# Patient Record
Sex: FEMALE | Race: WHITE | NOT HISPANIC OR LATINO | URBAN - METROPOLITAN AREA
[De-identification: names, ages, dates, MRNs, and addresses within clinical notes are randomized per-mention and may not be internally consistent; named-entity substitution may affect disease eponyms.]

---

## 2018-03-14 ENCOUNTER — OFFICE VISIT (OUTPATIENT)
Dept: CARDIOLOGY | Facility: CLINIC | Age: 78
End: 2018-03-14
Attending: INTERNAL MEDICINE
Payer: MEDICARE

## 2018-03-14 VITALS
BODY MASS INDEX: 22.98 KG/M2 | HEIGHT: 59 IN | HEART RATE: 78 BPM | DIASTOLIC BLOOD PRESSURE: 75 MMHG | WEIGHT: 114 LBS | SYSTOLIC BLOOD PRESSURE: 155 MMHG

## 2018-03-14 DIAGNOSIS — I77.1 SUBCLAVIAN ARTERY STENOSIS: ICD-10-CM

## 2018-03-14 DIAGNOSIS — Z98.890 HISTORY OF NECK SURGERY: ICD-10-CM

## 2018-03-14 DIAGNOSIS — R07.2 PRECORDIAL PAIN: ICD-10-CM

## 2018-03-14 DIAGNOSIS — I10 ESSENTIAL HYPERTENSION: ICD-10-CM

## 2018-03-14 DIAGNOSIS — I65.23 BILATERAL CAROTID ARTERY STENOSIS: ICD-10-CM

## 2018-03-14 DIAGNOSIS — E78.00 HYPERCHOLESTEROLEMIA: ICD-10-CM

## 2018-03-14 PROCEDURE — 99214 OFFICE O/P EST MOD 30 MIN: CPT | Mod: S$GLB,,, | Performed by: INTERNAL MEDICINE

## 2018-03-14 PROCEDURE — 3077F SYST BP >= 140 MM HG: CPT | Mod: CPTII,S$GLB,, | Performed by: INTERNAL MEDICINE

## 2018-03-14 PROCEDURE — 3078F DIAST BP <80 MM HG: CPT | Mod: CPTII,S$GLB,, | Performed by: INTERNAL MEDICINE

## 2018-03-14 NOTE — PROGRESS NOTES
Subjective:     Ting Cota is a 77 y.o. female with hypertension and hypercholesterolemia. She had a Stress Echocardiogram in 2013 that was negative. She had T wave inversion in the anteroseptal leads at that time. On 10/26/2015 she had an Echocardiogram that revealed normal left ventricular size and systolic function with moderate left ventricular hypertrophy with a sigmoid septum. There was mild diastolic dysfunction and the aortic valve was mildy sclerotic. A Carotid Duplex on the same day revealed the BRITT to have moderate plaquing with a less than 50% stenosis. The LICA had severe plaquing with a 50-60% stenosis. The right vertebral had retrograde flow suggesting a subclavian steal syndrome. A blood pressure check revealed a systolic pressure in the left arm of 160 mmHg whereas it was 115 mmHg in the right arm. 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 On 1/21/2016 she underwent a Stress MPI during which she was able to do 6:30 minutes and there was no chest pain. The ECG was negative as was the MPI. She developed weakness in her arms and was diagnosed with severe cervical disease and underwent neck surgery on 8/23/2016. No exertional chest pain or shortness of breath. No palpitations or weak spells. No bleeding. Still works part time. Feeling well overall.     Chest Pain    This is a new problem. The current episode started more than 1 month ago. The problem has been resolved. Pertinent negatives include no abdominal pain, back pain, claudication, cough, diaphoresis, dizziness, exertional chest pressure, fever, headaches, hemoptysis, irregular heartbeat, leg pain, lower extremity edema, malaise/fatigue, nausea, near-syncope, numbness, orthopnea, palpitations, PND, shortness of breath, sputum production, syncope, vomiting or weakness.   Her past medical history is  significant for hyperlipidemia.   Pertinent negatives for past medical history include no diabetes.   Hypertension   This is a chronic problem. The current episode started more than 1 year ago. The problem is unchanged. The problem is controlled (usually 120-140/60-80 mmHg at home). Pertinent negatives include no anxiety, blurred vision, chest pain, headaches, malaise/fatigue, neck pain, orthopnea, palpitations, peripheral edema, PND, shortness of breath or sweats. There is no history of chronic renal disease.   Hyperlipidemia   This is a chronic problem. The current episode started more than 1 year ago. The problem is controlled. Recent lipid tests were reviewed and are normal. She has no history of chronic renal disease, diabetes, hypothyroidism, liver disease, obesity or nephrotic syndrome. Pertinent negatives include no chest pain, focal sensory loss, focal weakness, leg pain, myalgias or shortness of breath.       Review of Systems   Constitution: Negative for chills, diaphoresis, fever, weakness and malaise/fatigue.   HENT: Negative for nosebleeds.    Eyes: Negative for blurred vision, vision loss in left eye and vision loss in right eye.   Cardiovascular: Negative for chest pain, claudication, dyspnea on exertion, irregular heartbeat, leg swelling, near-syncope, orthopnea, palpitations, paroxysmal nocturnal dyspnea and syncope.   Respiratory: Negative for cough, hemoptysis, shortness of breath, sputum production and wheezing.    Endocrine: Negative for cold intolerance and heat intolerance.   Hematologic/Lymphatic: Negative for bleeding problem. Does not bruise/bleed easily.   Skin: Negative for dry skin and rash.   Musculoskeletal: Negative for back pain, falls, gout, muscle cramps, myalgias and neck pain.   Gastrointestinal: Negative for abdominal pain, diarrhea, hematemesis, hematochezia, hemorrhoids, jaundice, melena, nausea and vomiting.   Genitourinary: Negative for dysuria, hematuria and menorrhagia.  "  Neurological: Negative for dizziness, focal weakness, headaches, light-headedness, loss of balance, numbness, tremors and vertigo.   Psychiatric/Behavioral: Negative for altered mental status, depression and memory loss. The patient is not nervous/anxious.    Allergic/Immunologic: Negative for hives and persistent infections.       Current Outpatient Prescriptions on File Prior to Visit   Medication Sig Dispense Refill    alendronate (FOSAMAX) 70 MG tablet   2    amlodipine (NORVASC) 10 MG tablet Take 1 tablet (10 mg total) by mouth once daily. 90 tablet 3    amoxicillin-clavulanate 875-125mg (AUGMENTIN) 875-125 mg per tablet   0    aspirin (ECOTRIN) 81 MG EC tablet Take 1 tablet (81 mg total) by mouth once daily. 90 tablet 3    atorvastatin (LIPITOR) 40 MG tablet Take 1 tablet (40 mg total) by mouth once daily. 90 tablet 3    benzonatate (TESSALON) 100 MG capsule   0    calcium carbonate (OS-MIGUEL) 500 mg calcium (1,250 mg) chewable tablet Take 1 tablet by mouth once daily.      CALCIUM GLUBIONATE (CALCIONATE ORAL) Take by mouth.      erythromycin (ROMYCIN) ophthalmic ointment   0    gabapentin (NEURONTIN) 100 MG capsule   2    hydrochlorothiazide (MICROZIDE) 12.5 mg capsule Take 1 capsule (12.5 mg total) by mouth once daily. 90 capsule 3    hydrocodone-acetaminophen 7.5-325mg (NORCO) 7.5-325 mg per tablet TK 1 T PO  BID TO TID  0    olmesartan (BENICAR) 40 MG tablet Take 1 tablet (40 mg total) by mouth once daily. 90 tablet 3    paroxetine (PAXIL) 10 MG tablet Take 10 mg by mouth every morning.      tizanidine (ZANAFLEX) 2 MG tablet   2    VESICARE 10 mg tablet TK 1 T PO QD  3    vitamin D 1000 units Tab Take 185 mg by mouth once daily.       No current facility-administered medications on file prior to visit.        BP (!) 155/75   Pulse 78   Ht 4' 11" (1.499 m)   Wt 51.7 kg (114 lb)   BMI 23.03 kg/m²       Objective:     Physical Exam   Constitutional: She is oriented to person, place, and " time. She appears well-developed and well-nourished. She does not appear ill. No distress.   HENT:   Head: Normocephalic and atraumatic.   Nose: Nose normal.   Eyes: Right eye exhibits no discharge. Left eye exhibits no discharge. Right conjunctiva is not injected. Left conjunctiva is not injected. Right pupil is round. Left pupil is round. Pupils are equal.   Neck: No JVD present. Carotid bruit is present (louder on the right than on the left side). No tracheal deviation present. No thyromegaly present.   Midline scar posteriorly.   Cardiovascular: Normal rate, regular rhythm, S1 normal and S2 normal.   No extrasystoles are present. PMI is not displaced.  Exam reveals gallop and S4. Exam reveals no S3.    Murmur heard.   Harsh midsystolic murmur is present with a grade of 2/6  at the upper right sternal border radiating to the neck  Pulses:       Carotid pulses are on the right side with bruit, and on the left side with bruit.       Radial pulses are 1+ on the right side, and 2+ on the left side.        Femoral pulses are 2+ on the right side, and 2+ on the left side.       Dorsalis pedis pulses are 1+ on the right side, and 1+ on the left side.        Posterior tibial pulses are 1+ on the right side, and 1+ on the left side.   Pulmonary/Chest: Effort normal and breath sounds normal.   Abdominal: Soft. Normal appearance. There is no hepatosplenomegaly. There is no tenderness.   Musculoskeletal:        Right ankle: She exhibits no swelling, no ecchymosis and no deformity.        Left ankle: She exhibits no swelling, no ecchymosis and no deformity.   Lymphadenopathy:        Head (right side): No submandibular adenopathy present.        Head (left side): No submandibular adenopathy present.     She has no cervical adenopathy.   Neurological: She is alert and oriented to person, place, and time. She is not disoriented. No cranial nerve deficit or sensory deficit.   Skin: Skin is warm, dry and intact. No rash noted. She  "is not diaphoretic. Nails show no clubbing.   Psychiatric: She has a normal mood and affect. Her speech is normal and behavior is normal. Judgment and thought content normal. Cognition and memory are normal.       Assessment:      1. Precordial pain    2. Bilateral carotid artery stenosis    3. Subclavian artery stenosis    4. Essential hypertension    5. Hypercholesterolemia    6. History of neck surgery        Plan:     1. Chest Pain   9/23/2013: Stress Echo: 4:30 min. No CP. ECG negative. Echo "negative" with moderate LVH.   10/26/2015: Echo: Normal left ventricular size and systolic function. Moderate LVH. Sigmoid septum. Mild diastolic dysfunction. Mildly dilated LA. Mild aortic valve sclerosis.   12/17/2015: Episode of chest tightness.   12/21/2015: ECG: SB 57. T wave inversion V1-V6 that is similar to 2013.   1/21/2016: Stress MPI: 6:30 min. No CP. ECG negative. MPI negative.   Appeared atypical and now resolved.    2. Carotid Artery Stenosis   10/26/2015: Carotid Duplex: BRITT: Moderate plaquing - <50%. LICA: Severe plaquing - 50-60%. Right vertebral: Retrograde flow.   10/17/2016: Carotid Duplex: BRITT: Moderate plaquing - <50%. LICA: Moderate plaquing - 1.2 m/s - 50-60%. Right vertebral: Retrograde flow.    10/16/2017: Carotid Duplex: BRITT: Moderate plaquing - <50%. LICA: Severe plaquing - 1.5 m/s - 50-60%.   Control risk factors.   On aspirin 81 mg Q24.   10/2018: Plan next Carotid Duplex. Then yearly.    3. Subclavian Artery Stenosis   2015: Clinical diagnosis.   10/26/2015: Carotid Duplex: Right vertebral: Retrograde flow.   Right arm: 115/55 mmHg. Left arm: 160/68 mmHg.   May have had a little heaviness in the right arm. Never dizzy.   Appears asymptomatic now.    4. Hypertension   1995: Diagnosed.   On amlodipine 10 mg Q24, olmesartan 40 mg Q24 and hctz 12.5 mg Q24.   Keeping log at home.   Appears well controlled at home but high here.   Follow closely.       5. Hypercholesterolemia   2005: Began " statin.   On atorvastatin 10 mg Q24.   12/28/2015: Chol 189. HDL 45. . .   On atorvastatin to 40 mg Q24.   2/8/2016: Chol 179. HDL 48. . .   On atorvastatin to 40 mg Q24.   Reasonably well controlled.    6. History of Cervical Neck Surgery   8/23/2016: Cervical neck surgery.    Dr. Ramsey Becker.     7. Primary Care   Dr. Sultana Guzman.    F/u 4 months.    Tammy Walton M.D.

## 2018-04-05 ENCOUNTER — OFFICE VISIT (OUTPATIENT)
Dept: CARDIOLOGY | Facility: CLINIC | Age: 78
End: 2018-04-05
Attending: INTERNAL MEDICINE
Payer: MEDICARE

## 2018-04-05 VITALS
HEIGHT: 59 IN | HEART RATE: 87 BPM | WEIGHT: 115 LBS | BODY MASS INDEX: 23.18 KG/M2 | DIASTOLIC BLOOD PRESSURE: 66 MMHG | SYSTOLIC BLOOD PRESSURE: 145 MMHG

## 2018-04-05 DIAGNOSIS — R07.2 PRECORDIAL PAIN: ICD-10-CM

## 2018-04-05 DIAGNOSIS — I65.23 BILATERAL CAROTID ARTERY STENOSIS: ICD-10-CM

## 2018-04-05 DIAGNOSIS — E78.00 HYPERCHOLESTEROLEMIA: ICD-10-CM

## 2018-04-05 DIAGNOSIS — I50.33 HEART FAILURE, DIASTOLIC, ACUTE ON CHRONIC: ICD-10-CM

## 2018-04-05 DIAGNOSIS — I10 ESSENTIAL HYPERTENSION: ICD-10-CM

## 2018-04-05 DIAGNOSIS — I77.1 SUBCLAVIAN ARTERY STENOSIS: ICD-10-CM

## 2018-04-05 DIAGNOSIS — Z98.890 HISTORY OF NECK SURGERY: ICD-10-CM

## 2018-04-05 PROCEDURE — 99214 OFFICE O/P EST MOD 30 MIN: CPT | Mod: S$GLB,,, | Performed by: INTERNAL MEDICINE

## 2018-04-05 PROCEDURE — 3077F SYST BP >= 140 MM HG: CPT | Mod: CPTII,S$GLB,, | Performed by: INTERNAL MEDICINE

## 2018-04-05 PROCEDURE — 3078F DIAST BP <80 MM HG: CPT | Mod: CPTII,S$GLB,, | Performed by: INTERNAL MEDICINE

## 2018-04-05 RX ORDER — FUROSEMIDE 20 MG/1
20 TABLET ORAL DAILY
Qty: 90 TABLET | Refills: 3 | Status: SHIPPED | OUTPATIENT
Start: 2018-04-05 | End: 2018-07-13 | Stop reason: SDUPTHER

## 2018-04-05 NOTE — PROGRESS NOTES
Subjective:     Ting Cota is a 77 y.o. female with hypertension and hypercholesterolemia. She had a Stress Echocardiogram in 2013 that was negative. She had T wave inversion in the anteroseptal leads at that time. On 10/26/2015 she had an Echocardiogram that revealed normal left ventricular size and systolic function with moderate left ventricular hypertrophy with a sigmoid septum. There was mild diastolic dysfunction and the aortic valve was mildy sclerotic. A Carotid Duplex on the same day revealed the BRITT to have moderate plaquing with a less than 50% stenosis. The LICA had severe plaquing with a 50-60% stenosis. The right vertebral had retrograde flow suggesting a subclavian steal syndrome. A blood pressure check revealed a systolic pressure in the left arm of 160 mmHg whereas it was 115 mmHg in the right arm. 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 On 1/21/2016 she underwent a Stress MPI during which she was able to do 6:30 minutes and there was no chest pain. The ECG was negative as was the MPI. She developed weakness in her arms and was diagnosed with severe cervical disease and underwent neck surgery on 8/23/2016. She developed an upper respiratory infection in late 3/2018 and was seen at an urgent care center and given antibiotics. A CXR revealed small pleural effusions. No exertional chest pain but shortness of breath. No palpitations or weak spells. No bleeding. Still works part time. Feeling well overall.     Chest Pain    This is a new problem. The current episode started more than 1 month ago. The problem has been resolved. Associated symptoms include a cough, orthopnea and shortness of breath. Pertinent negatives include no abdominal pain, back pain, claudication, diaphoresis, dizziness, exertional chest pressure, fever, headaches, hemoptysis, irregular  heartbeat, leg pain, lower extremity edema, malaise/fatigue, nausea, near-syncope, numbness, palpitations, PND, sputum production, syncope, vomiting or weakness.   Her past medical history is significant for CHF and hyperlipidemia.   Pertinent negatives for past medical history include no diabetes and no muscle weakness.   Hypertension   This is a chronic problem. The current episode started more than 1 year ago. The problem is unchanged. The problem is controlled (usually 120-140/60-80 mmHg at home). Associated symptoms include chest pain, neck pain, orthopnea and shortness of breath. Pertinent negatives include no anxiety, blurred vision, headaches, malaise/fatigue, palpitations, peripheral edema, PND or sweats. There is no history of chronic renal disease.   Hyperlipidemia   This is a chronic problem. The current episode started more than 1 year ago. The problem is controlled. Recent lipid tests were reviewed and are normal. She has no history of chronic renal disease, diabetes, hypothyroidism, liver disease, obesity or nephrotic syndrome. Associated symptoms include chest pain and shortness of breath. Pertinent negatives include no focal sensory loss, focal weakness, leg pain or myalgias.   Congestive Heart Failure   Presents for follow-up visit. Associated symptoms include chest pain and shortness of breath. Pertinent negatives include no abdominal pain, chest pressure, claudication, edema, fatigue, muscle weakness, near-syncope, nocturia, orthopnea, palpitations, paroxysmal nocturnal dyspnea or unexpected weight change. The symptoms have been worsening.       Review of Systems   Constitution: Negative for chills, diaphoresis, fatigue, fever, weakness, malaise/fatigue and unexpected weight change.   HENT: Negative for nosebleeds.    Eyes: Negative for blurred vision, vision loss in left eye and vision loss in right eye.   Cardiovascular: Positive for chest pain and orthopnea. Negative for claudication, dyspnea  on exertion, irregular heartbeat, leg swelling, near-syncope, palpitations, paroxysmal nocturnal dyspnea and syncope.   Respiratory: Positive for cough and shortness of breath. Negative for hemoptysis, sputum production and wheezing.    Endocrine: Negative for cold intolerance and heat intolerance.   Hematologic/Lymphatic: Negative for bleeding problem. Does not bruise/bleed easily.   Skin: Negative for dry skin and rash.   Musculoskeletal: Positive for neck pain. Negative for back pain, falls, gout, muscle cramps, muscle weakness and myalgias.   Gastrointestinal: Negative for abdominal pain, diarrhea, hematemesis, hematochezia, hemorrhoids, jaundice, melena, nausea and vomiting.   Genitourinary: Negative for dysuria, hematuria, menorrhagia and nocturia.   Neurological: Negative for dizziness, focal weakness, headaches, light-headedness, loss of balance, numbness, tremors and vertigo.   Psychiatric/Behavioral: Negative for altered mental status, depression and memory loss. The patient is not nervous/anxious.    Allergic/Immunologic: Negative for hives and persistent infections.       Current Outpatient Prescriptions on File Prior to Visit   Medication Sig Dispense Refill    alendronate (FOSAMAX) 70 MG tablet   2    amlodipine (NORVASC) 10 MG tablet Take 1 tablet (10 mg total) by mouth once daily. 90 tablet 3    amoxicillin-clavulanate 875-125mg (AUGMENTIN) 875-125 mg per tablet   0    aspirin (ECOTRIN) 81 MG EC tablet Take 1 tablet (81 mg total) by mouth once daily. 90 tablet 3    atorvastatin (LIPITOR) 40 MG tablet Take 1 tablet (40 mg total) by mouth once daily. 90 tablet 3    benzonatate (TESSALON) 100 MG capsule   0    calcium carbonate (OS-MIGUEL) 500 mg calcium (1,250 mg) chewable tablet Take 1 tablet by mouth once daily.      CALCIUM GLUBIONATE (CALCIONATE ORAL) Take by mouth.      erythromycin (ROMYCIN) ophthalmic ointment   0    gabapentin (NEURONTIN) 100 MG capsule   2    hydrochlorothiazide  "(MICROZIDE) 12.5 mg capsule Take 1 capsule (12.5 mg total) by mouth once daily. 90 capsule 3    hydrocodone-acetaminophen 7.5-325mg (NORCO) 7.5-325 mg per tablet TK 1 T PO  BID TO TID  0    olmesartan (BENICAR) 40 MG tablet Take 1 tablet (40 mg total) by mouth once daily. 90 tablet 3    paroxetine (PAXIL) 10 MG tablet Take 10 mg by mouth every morning.      tizanidine (ZANAFLEX) 2 MG tablet   2    VESICARE 10 mg tablet TK 1 T PO QD  3    vitamin D 1000 units Tab Take 185 mg by mouth once daily.       No current facility-administered medications on file prior to visit.        BP (!) 145/66   Pulse 87   Ht 4' 11" (1.499 m)   Wt 52.2 kg (115 lb)   BMI 23.23 kg/m²       Objective:     Physical Exam   Constitutional: She is oriented to person, place, and time. She appears well-developed and well-nourished. She does not appear ill. No distress.   HENT:   Head: Normocephalic and atraumatic.   Nose: Nose normal.   Eyes: Right eye exhibits no discharge. Left eye exhibits no discharge. Right conjunctiva is not injected. Left conjunctiva is not injected. Right pupil is round. Left pupil is round. Pupils are equal.   Neck: No JVD present. Carotid bruit is present (louder on the right than on the left side). No tracheal deviation present. No thyromegaly present.   Midline scar posteriorly.   Cardiovascular: Normal rate, regular rhythm, S1 normal and S2 normal.   No extrasystoles are present. PMI is not displaced.  Exam reveals gallop and S4. Exam reveals no S3.    Murmur heard.   Harsh midsystolic murmur is present with a grade of 2/6  at the upper right sternal border radiating to the neck  Pulses:       Carotid pulses are on the right side with bruit, and on the left side with bruit.       Radial pulses are 1+ on the right side, and 2+ on the left side.        Femoral pulses are 2+ on the right side, and 2+ on the left side.       Dorsalis pedis pulses are 1+ on the right side, and 1+ on the left side.        " "Posterior tibial pulses are 1+ on the right side, and 1+ on the left side.   Pulmonary/Chest: Effort normal. She has rales in the right lower field and the left lower field.   Abdominal: Soft. Normal appearance. There is no hepatosplenomegaly. There is no tenderness.   Musculoskeletal:        Right ankle: She exhibits no swelling, no ecchymosis and no deformity.        Left ankle: She exhibits no swelling, no ecchymosis and no deformity.   Lymphadenopathy:        Head (right side): No submandibular adenopathy present.        Head (left side): No submandibular adenopathy present.     She has no cervical adenopathy.   Neurological: She is alert and oriented to person, place, and time. She is not disoriented. No cranial nerve deficit or sensory deficit.   Skin: Skin is warm, dry and intact. No rash noted. She is not diaphoretic. Nails show no clubbing.   Psychiatric: She has a normal mood and affect. Her speech is normal and behavior is normal. Judgment and thought content normal. Cognition and memory are normal.       Assessment:      1. Precordial pain    2. Heart failure, diastolic, acute on chronic    3. Bilateral carotid artery stenosis    4. Subclavian artery stenosis    5. Essential hypertension    6. Hypercholesterolemia    7. History of neck surgery        Plan:     1. Chest Pain   9/23/2013: Stress Echo: 4:30 min. No CP. ECG negative. Echo "negative" with moderate LVH.   10/26/2015: Echo: Normal left ventricular size and systolic function. Moderate LVH. Sigmoid septum. Mild diastolic dysfunction. Mildly dilated LA. Mild aortic valve sclerosis.   12/17/2015: Episode of chest tightness.   12/21/2015: ECG: SB 57. T wave inversion V1-V6 that is similar to 2013.   1/21/2016: Stress MPI: 6:30 min. No CP. ECG negative. MPI negative.   Appeared atypical and now resolved.    2. Heart Failure, Diastolic, Acute on Chronic   4/4/2018: CXR: Small pleural effusions.   Change hctz to furosemide 20 mg Q24.   4/12/2018: BMP " and BNP.   4/20/2018: CXR.   4/2018: Plan Echo.    3. Carotid Artery Stenosis   10/26/2015: Carotid Duplex: BRITT: Moderate plaquing - <50%. LICA: Severe plaquing - 50-60%. Right vertebral: Retrograde flow.   10/17/2016: Carotid Duplex: BRITT: Moderate plaquing - <50%. LICA: Moderate plaquing - 1.2 m/s - 50-60%. Right vertebral: Retrograde flow.    10/16/2017: Carotid Duplex: BRITT: Moderate plaquing - <50%. LICA: Severe plaquing - 1.5 m/s - 50-60%.   Control risk factors.   On aspirin 81 mg Q24.   10/2018: Plan next Carotid Duplex. Then yearly.    4. Subclavian Artery Stenosis   2015: Clinical diagnosis.   10/26/2015: Carotid Duplex: Right vertebral: Retrograde flow.   Right arm: 115/55 mmHg. Left arm: 160/68 mmHg.   May have had a little heaviness in the right arm. Never dizzy.   Appears asymptomatic now.    5. Hypertension   1995: Diagnosed.   On amlodipine 10 mg Q24, olmesartan 40 mg Q24 and hctz 12.5 mg Q24.   Keeping log at home.   Appears well controlled at home but high here.   Follow closely.       6. Hypercholesterolemia   2005: Began statin.   On atorvastatin 10 mg Q24.   12/28/2015: Chol 189. HDL 45. . .   On atorvastatin to 40 mg Q24.   2/8/2016: Chol 179. HDL 48. . .   On atorvastatin to 40 mg Q24.   Reasonably well controlled.    7. History of Cervical Neck Surgery   8/23/2016: Cervical neck surgery.    Dr. Ramsey Becker.     8. Primary Care   Dr. Sultana Guzman.    F/u 3 weeks.    Tammy Walton M.D.      4/14/2018 5:59 PM, Addendum:    4/11/2018: Echo: Normal left ventricular size and systolic function. Mild LVH. Sigmoid septum - 1.8 m/s - 12 mmHg. Mild diastolic dysfunction. Mildly dilated LA. Mild aortic valve sclerosis.    I discussed the above test result and the implications of the findings over the phone.    Tammy Walton M.D.

## 2018-04-10 LAB
BNP SERPL-MCNC: 26 PG/ML
BUN SERPL-MCNC: 20 MG/DL (ref 7–25)
BUN/CREAT SERPL: ABNORMAL (CALC) (ref 6–22)
CALCIUM SERPL-MCNC: 9.2 MG/DL (ref 8.6–10.4)
CHLORIDE SERPL-SCNC: 110 MMOL/L (ref 98–110)
CO2 SERPL-SCNC: 28 MMOL/L (ref 20–31)
CREAT SERPL-MCNC: 0.69 MG/DL (ref 0.6–0.93)
GFR SERPL CREATININE-BSD FRML MDRD: 83 ML/MIN/1.73M2
GLUCOSE SERPL-MCNC: 130 MG/DL (ref 65–99)
POTASSIUM SERPL-SCNC: 4.2 MMOL/L (ref 3.5–5.3)
SODIUM SERPL-SCNC: 146 MMOL/L (ref 135–146)

## 2018-04-11 ENCOUNTER — CLINICAL SUPPORT (OUTPATIENT)
Dept: CARDIOLOGY | Facility: CLINIC | Age: 78
End: 2018-04-11
Payer: MEDICARE

## 2018-04-11 DIAGNOSIS — I50.33 HEART FAILURE, DIASTOLIC, ACUTE ON CHRONIC: ICD-10-CM

## 2018-04-11 PROCEDURE — 93306 TTE W/DOPPLER COMPLETE: CPT | Mod: S$GLB,,, | Performed by: INTERNAL MEDICINE

## 2018-04-16 DIAGNOSIS — I50.33 HEART FAILURE, DIASTOLIC, ACUTE ON CHRONIC: ICD-10-CM

## 2018-04-20 ENCOUNTER — HOSPITAL ENCOUNTER (OUTPATIENT)
Dept: RADIOLOGY | Facility: HOSPITAL | Age: 78
Discharge: HOME | End: 2018-04-20
Attending: INTERNAL MEDICINE
Payer: MEDICARE

## 2018-04-20 DIAGNOSIS — I50.33 HEART FAILURE, DIASTOLIC, ACUTE ON CHRONIC: ICD-10-CM

## 2018-04-20 PROCEDURE — 71046 X-RAY EXAM CHEST 2 VIEWS: CPT | Mod: TC,FY

## 2018-04-20 PROCEDURE — 71046 X-RAY EXAM CHEST 2 VIEWS: CPT | Mod: 26,,, | Performed by: RADIOLOGY

## 2018-04-27 ENCOUNTER — OFFICE VISIT (OUTPATIENT)
Dept: CARDIOLOGY | Facility: CLINIC | Age: 78
End: 2018-04-27
Attending: INTERNAL MEDICINE
Payer: MEDICARE

## 2018-04-27 VITALS
BODY MASS INDEX: 22.98 KG/M2 | HEART RATE: 67 BPM | DIASTOLIC BLOOD PRESSURE: 63 MMHG | SYSTOLIC BLOOD PRESSURE: 137 MMHG | WEIGHT: 114 LBS | HEIGHT: 59 IN

## 2018-04-27 DIAGNOSIS — Z98.890 HISTORY OF NECK SURGERY: ICD-10-CM

## 2018-04-27 DIAGNOSIS — I65.23 BILATERAL CAROTID ARTERY STENOSIS: ICD-10-CM

## 2018-04-27 DIAGNOSIS — I10 ESSENTIAL HYPERTENSION: ICD-10-CM

## 2018-04-27 DIAGNOSIS — I77.1 SUBCLAVIAN ARTERY STENOSIS: ICD-10-CM

## 2018-04-27 DIAGNOSIS — I50.33 HEART FAILURE, DIASTOLIC, ACUTE ON CHRONIC: ICD-10-CM

## 2018-04-27 DIAGNOSIS — I50.32 HEART FAILURE, DIASTOLIC, CHRONIC: ICD-10-CM

## 2018-04-27 DIAGNOSIS — E78.00 HYPERCHOLESTEROLEMIA: ICD-10-CM

## 2018-04-27 DIAGNOSIS — R07.2 PRECORDIAL PAIN: ICD-10-CM

## 2018-04-27 PROCEDURE — 3075F SYST BP GE 130 - 139MM HG: CPT | Mod: CPTII,S$GLB,, | Performed by: INTERNAL MEDICINE

## 2018-04-27 PROCEDURE — 99214 OFFICE O/P EST MOD 30 MIN: CPT | Mod: S$GLB,,, | Performed by: INTERNAL MEDICINE

## 2018-04-27 PROCEDURE — 3078F DIAST BP <80 MM HG: CPT | Mod: CPTII,S$GLB,, | Performed by: INTERNAL MEDICINE

## 2018-04-27 RX ORDER — METOPROLOL SUCCINATE 50 MG/1
50 TABLET, EXTENDED RELEASE ORAL DAILY
Qty: 90 TABLET | Refills: 3 | Status: SHIPPED | OUTPATIENT
Start: 2018-04-27 | End: 2018-07-13 | Stop reason: SDUPTHER

## 2018-04-27 NOTE — PROGRESS NOTES
Subjective:     Ting Cota is a 77 y.o. female with hypertension and hypercholesterolemia. She had a Stress Echocardiogram in 2013 that was negative. She had T wave inversion in the anteroseptal leads at that time. On 10/26/2015 she had an Echocardiogram that revealed normal left ventricular size and systolic function with moderate left ventricular hypertrophy with a sigmoid septum. There was mild diastolic dysfunction and the aortic valve was mildy sclerotic. A Carotid Duplex on the same day revealed the BRITT to have moderate plaquing with a less than 50% stenosis. The LICA had severe plaquing with a 50-60% stenosis. The right vertebral had retrograde flow suggesting a subclavian steal syndrome. A blood pressure check revealed a systolic pressure in the left arm of 160 mmHg whereas it was 115 mmHg in the right arm. 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 On 1/21/2016 she underwent a Stress MPI during which she was able to do 6:30 minutes and there was no chest pain. The ECG was negative as was the MPI. She developed weakness in her arms and was diagnosed with severe cervical disease and underwent neck surgery on 8/23/2016. She developed an upper respiratory infection in late 3/2018 and was seen at an urgent care center and given antibiotics. A CXR revealed small pleural effusions.  On 4/5/2018 she was began on furosemide and her dyspnea resolved. No exertional chest pain but shortness of breath. No palpitations or weak spells. No bleeding. Still works part time. Feeling well overall.     Chest Pain    This is a chronic problem. The current episode started more than 1 month ago. The problem has been resolved. Pertinent negatives include no abdominal pain, back pain, claudication, cough, diaphoresis, dizziness, exertional chest pressure, fever, headaches, hemoptysis, irregular  heartbeat, leg pain, lower extremity edema, malaise/fatigue, nausea, near-syncope, numbness, orthopnea, palpitations, PND, shortness of breath, sputum production, syncope, vomiting or weakness.   Her past medical history is significant for CHF and hyperlipidemia.   Pertinent negatives for past medical history include no diabetes and no muscle weakness.   Congestive Heart Failure   Presents for follow-up visit. Pertinent negatives include no abdominal pain, chest pain, chest pressure, claudication, edema, fatigue, muscle weakness, near-syncope, nocturia, orthopnea, palpitations, paroxysmal nocturnal dyspnea, shortness of breath or unexpected weight change. The symptoms have been stable.   Hypertension   This is a chronic problem. The current episode started more than 1 year ago. The problem is unchanged. The problem is controlled (usually 130-145/60-80 mmHg at home). Associated symptoms include neck pain. Pertinent negatives include no anxiety, blurred vision, chest pain, headaches, malaise/fatigue, orthopnea, palpitations, peripheral edema, PND, shortness of breath or sweats. There is no history of chronic renal disease.   Hyperlipidemia   This is a chronic problem. The current episode started more than 1 year ago. The problem is controlled. Recent lipid tests were reviewed and are normal. She has no history of chronic renal disease, diabetes, hypothyroidism, liver disease, obesity or nephrotic syndrome. Pertinent negatives include no chest pain, focal sensory loss, focal weakness, leg pain, myalgias or shortness of breath.       Review of Systems   Constitution: Negative for chills, diaphoresis, fatigue, fever, weakness, malaise/fatigue and unexpected weight change.   HENT: Negative for nosebleeds.    Eyes: Negative for blurred vision, vision loss in left eye and vision loss in right eye.   Cardiovascular: Negative for chest pain, claudication, dyspnea on exertion, irregular heartbeat, leg swelling, near-syncope,  orthopnea, palpitations, paroxysmal nocturnal dyspnea and syncope.   Respiratory: Negative for cough, hemoptysis, shortness of breath, sputum production and wheezing.    Endocrine: Negative for cold intolerance and heat intolerance.   Hematologic/Lymphatic: Negative for bleeding problem. Does not bruise/bleed easily.   Skin: Negative for dry skin and rash.   Musculoskeletal: Positive for neck pain. Negative for back pain, falls, gout, muscle cramps, muscle weakness and myalgias.   Gastrointestinal: Negative for abdominal pain, diarrhea, hematemesis, hematochezia, hemorrhoids, jaundice, melena, nausea and vomiting.   Genitourinary: Negative for dysuria, hematuria, menorrhagia and nocturia.   Neurological: Negative for dizziness, focal weakness, headaches, light-headedness, loss of balance, numbness, tremors and vertigo.   Psychiatric/Behavioral: Negative for altered mental status, depression and memory loss. The patient is not nervous/anxious.    Allergic/Immunologic: Negative for hives and persistent infections.       Current Outpatient Prescriptions on File Prior to Visit   Medication Sig Dispense Refill    alendronate (FOSAMAX) 70 MG tablet   2    amlodipine (NORVASC) 10 MG tablet Take 1 tablet (10 mg total) by mouth once daily. 90 tablet 3    amoxicillin-clavulanate 875-125mg (AUGMENTIN) 875-125 mg per tablet   0    aspirin (ECOTRIN) 81 MG EC tablet Take 1 tablet (81 mg total) by mouth once daily. 90 tablet 3    atorvastatin (LIPITOR) 40 MG tablet Take 1 tablet (40 mg total) by mouth once daily. 90 tablet 3    benzonatate (TESSALON) 100 MG capsule   0    calcium carbonate (OS-MIGUEL) 500 mg calcium (1,250 mg) chewable tablet Take 1 tablet by mouth once daily.      CALCIUM GLUBIONATE (CALCIONATE ORAL) Take by mouth.      erythromycin (ROMYCIN) ophthalmic ointment   0    furosemide (LASIX) 20 MG tablet Take 1 tablet (20 mg total) by mouth once daily. 90 tablet 3    gabapentin (NEURONTIN) 100 MG capsule   2  "   hydrocodone-acetaminophen 7.5-325mg (NORCO) 7.5-325 mg per tablet TK 1 T PO  BID TO TID  0    olmesartan (BENICAR) 40 MG tablet Take 1 tablet (40 mg total) by mouth once daily. 90 tablet 3    paroxetine (PAXIL) 10 MG tablet Take 10 mg by mouth every morning.      tizanidine (ZANAFLEX) 2 MG tablet   2    VESICARE 10 mg tablet TK 1 T PO QD  3    vitamin D 1000 units Tab Take 185 mg by mouth once daily.       No current facility-administered medications on file prior to visit.        /63   Pulse 67   Ht 4' 11" (1.499 m)   Wt 51.7 kg (114 lb)   BMI 23.03 kg/m²       Objective:     Physical Exam   Constitutional: She is oriented to person, place, and time. She appears well-developed and well-nourished. She does not appear ill. No distress.   HENT:   Head: Normocephalic and atraumatic.   Nose: Nose normal.   Eyes: Right eye exhibits no discharge. Left eye exhibits no discharge. Right conjunctiva is not injected. Left conjunctiva is not injected. Right pupil is round. Left pupil is round. Pupils are equal.   Neck: No JVD present. Carotid bruit is present (louder on the right than on the left side). No tracheal deviation present. No thyromegaly present.   Midline scar posteriorly.   Cardiovascular: Normal rate, regular rhythm, S1 normal and S2 normal.   No extrasystoles are present. PMI is not displaced.  Exam reveals gallop and S4. Exam reveals no S3.    Murmur heard.   Harsh midsystolic murmur is present with a grade of 2/6  at the upper right sternal border radiating to the neck  Pulses:       Carotid pulses are on the right side with bruit, and on the left side with bruit.       Radial pulses are 1+ on the right side, and 2+ on the left side.        Femoral pulses are 2+ on the right side, and 2+ on the left side.       Dorsalis pedis pulses are 1+ on the right side, and 1+ on the left side.        Posterior tibial pulses are 1+ on the right side, and 1+ on the left side.   Pulmonary/Chest: Effort " "normal and breath sounds normal.   Abdominal: Soft. Normal appearance. There is no hepatosplenomegaly. There is no tenderness.   Musculoskeletal:        Right ankle: She exhibits no swelling, no ecchymosis and no deformity.        Left ankle: She exhibits no swelling, no ecchymosis and no deformity.   Lymphadenopathy:        Head (right side): No submandibular adenopathy present.        Head (left side): No submandibular adenopathy present.     She has no cervical adenopathy.   Neurological: She is alert and oriented to person, place, and time. She is not disoriented. No cranial nerve deficit or sensory deficit.   Skin: Skin is warm, dry and intact. No rash noted. She is not diaphoretic. Nails show no clubbing.   Psychiatric: She has a normal mood and affect. Her speech is normal and behavior is normal. Judgment and thought content normal. Cognition and memory are normal.       Assessment:      1. Precordial pain    2. Heart failure, diastolic, acute on chronic    3. Bilateral carotid artery stenosis    4. Subclavian artery stenosis    5. Essential hypertension    6. Hypercholesterolemia    7. History of neck surgery    8. Heart failure, diastolic, chronic        Plan:     1. Chest Pain   9/23/2013: Stress Echo: 4:30 min. No CP. ECG negative. Echo "negative" with moderate LVH.   10/26/2015: Echo: Normal left ventricular size and systolic function. Moderate LVH. Sigmoid septum. Mild diastolic dysfunction. Mildly dilated LA. Mild aortic valve sclerosis.   12/17/2015: Episode of chest tightness.   12/21/2015: ECG: SB 57. T wave inversion V1-V6 that is similar to 2013.   1/21/2016: Stress MPI: 6:30 min. No CP. ECG negative. MPI negative.   Appeared atypical and now resolved.    2. Heart Failure, Diastolic, Chronic   4/11/2018: Echo: Normal left ventricular size and systolic function. Mild LVH. Sigmoid septum - 1.8 m/s - 12 mmHg. Mild diastolic dysfunction. Mildly dilated LA. Mild aortic valve sclerosis.   4/4/2018: CXR: " Small pleural effusions.   On furosemide 20 mg Q24.   Well compensated.    3. Carotid Artery Stenosis   10/26/2015: Carotid Duplex: BRITT: Moderate plaquing - <50%. LICA: Severe plaquing - 50-60%. Right vertebral: Retrograde flow.   10/17/2016: Carotid Duplex: BRITT: Moderate plaquing - <50%. LICA: Moderate plaquing - 1.2 m/s - 50-60%. Right vertebral: Retrograde flow.    10/16/2017: Carotid Duplex: BRITT: Moderate plaquing - <50%. LICA: Severe plaquing - 1.5 m/s - 50-60%.   10/2018: Plan next Carotid Duplex. Then yearly.   On aspirin 81 mg Q24.   Control risk factors.    4. Subclavian Artery Stenosis   2015: Clinical diagnosis.   10/26/2015: Carotid Duplex: Right vertebral: Retrograde flow.   Right arm: 115/55 mmHg. Left arm: 160/68 mmHg.   May have had a little heaviness in the right arm. Never dizzy.   Appears asymptomatic now.    5. Hypertension   1995: Diagnosed.   On amlodipine 10 mg Q24, olmesartan 40 mg Q24 and furosemide 20 mg Q24.   Keeping log at home.   Running slightly high.   Add metoprolol 50 mg Q24.       6. Hypercholesterolemia   2005: Began statin.   On atorvastatin 10 mg Q24.   12/28/2015: Chol 189. HDL 45. . .   On atorvastatin to 40 mg Q24.   2/8/2016: Chol 179. HDL 48. . .   On atorvastatin to 40 mg Q24.   Reasonably well controlled.    7. History of Cervical Neck Surgery   8/23/2016: Cervical neck surgery.    Dr. Ramsey Becker.     8. Primary Care   Dr. Sultana Guzman.    F/u 2 months.    Tammy Walton M.D.

## 2018-07-13 ENCOUNTER — OFFICE VISIT (OUTPATIENT)
Dept: CARDIOLOGY | Facility: CLINIC | Age: 78
End: 2018-07-13
Attending: INTERNAL MEDICINE
Payer: MEDICARE

## 2018-07-13 VITALS
DIASTOLIC BLOOD PRESSURE: 67 MMHG | HEART RATE: 66 BPM | SYSTOLIC BLOOD PRESSURE: 116 MMHG | HEIGHT: 59 IN | WEIGHT: 117 LBS | BODY MASS INDEX: 23.59 KG/M2

## 2018-07-13 DIAGNOSIS — I77.1 SUBCLAVIAN ARTERY STENOSIS: ICD-10-CM

## 2018-07-13 DIAGNOSIS — Z87.898 HISTORY OF CHEST PAIN: ICD-10-CM

## 2018-07-13 DIAGNOSIS — I10 ESSENTIAL HYPERTENSION: ICD-10-CM

## 2018-07-13 DIAGNOSIS — E78.00 HYPERCHOLESTEROLEMIA: ICD-10-CM

## 2018-07-13 DIAGNOSIS — I65.23 BILATERAL CAROTID ARTERY STENOSIS: ICD-10-CM

## 2018-07-13 DIAGNOSIS — I50.32 HEART FAILURE, DIASTOLIC, CHRONIC: ICD-10-CM

## 2018-07-13 DIAGNOSIS — Z98.890 HISTORY OF NECK SURGERY: ICD-10-CM

## 2018-07-13 DIAGNOSIS — I50.33 HEART FAILURE, DIASTOLIC, ACUTE ON CHRONIC: ICD-10-CM

## 2018-07-13 PROCEDURE — 99214 OFFICE O/P EST MOD 30 MIN: CPT | Mod: S$GLB,,, | Performed by: INTERNAL MEDICINE

## 2018-07-13 PROCEDURE — 3078F DIAST BP <80 MM HG: CPT | Mod: CPTII,S$GLB,, | Performed by: INTERNAL MEDICINE

## 2018-07-13 PROCEDURE — 3074F SYST BP LT 130 MM HG: CPT | Mod: CPTII,S$GLB,, | Performed by: INTERNAL MEDICINE

## 2018-07-13 RX ORDER — METOPROLOL SUCCINATE 50 MG/1
50 TABLET, EXTENDED RELEASE ORAL DAILY
Qty: 90 TABLET | Refills: 3 | Status: SHIPPED | OUTPATIENT
Start: 2018-07-13 | End: 2019-04-22 | Stop reason: SDUPTHER

## 2018-07-13 RX ORDER — OLMESARTAN MEDOXOMIL 40 MG/1
40 TABLET ORAL DAILY
Qty: 90 TABLET | Refills: 3 | Status: SHIPPED | OUTPATIENT
Start: 2018-07-13 | End: 2019-04-04 | Stop reason: SDUPTHER

## 2018-07-13 RX ORDER — AMLODIPINE BESYLATE 10 MG/1
10 TABLET ORAL DAILY
Qty: 90 TABLET | Refills: 3 | Status: SHIPPED | OUTPATIENT
Start: 2018-07-13 | End: 2019-04-22 | Stop reason: SDUPTHER

## 2018-07-13 RX ORDER — ATORVASTATIN CALCIUM 40 MG/1
40 TABLET, FILM COATED ORAL DAILY
Qty: 90 TABLET | Refills: 3 | Status: SHIPPED | OUTPATIENT
Start: 2018-07-13 | End: 2019-04-22 | Stop reason: SDUPTHER

## 2018-07-13 RX ORDER — FUROSEMIDE 20 MG/1
20 TABLET ORAL DAILY
Qty: 90 TABLET | Refills: 3 | Status: SHIPPED | OUTPATIENT
Start: 2018-07-13 | End: 2019-04-22 | Stop reason: SDUPTHER

## 2018-07-13 RX ORDER — ASPIRIN 81 MG/1
81 TABLET ORAL DAILY
Qty: 90 TABLET | Refills: 3 | COMMUNITY
Start: 2018-07-13 | End: 2019-04-22 | Stop reason: SDUPTHER

## 2018-07-13 NOTE — PROGRESS NOTES
Subjective:     Ting Ward is a 77 y.o. female with hypertension and hypercholesterolemia. She had a Stress Echocardiogram in 2013 that was negative. She had T wave inversion in the anteroseptal leads at that time. On 10/26/2015 she had an Echocardiogram that revealed normal left ventricular size and systolic function with moderate left ventricular hypertrophy with a sigmoid septum. There was mild diastolic dysfunction and the aortic valve was mildy sclerotic. A Carotid Duplex on the same day revealed the BRITT to have moderate plaquing with a less than 50% stenosis. The LICA had severe plaquing with a 50-60% stenosis. The right vertebral had retrograde flow suggesting a subclavian steal syndrome. A blood pressure check revealed a systolic pressure in the left arm of 160 mmHg whereas it was 115 mmHg in the right arm. 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 On 1/21/2016 she underwent a Stress MPI during which she was able to do 6:30 minutes and there was no chest pain. The ECG was negative as was the MPI. She developed weakness in her arms and was diagnosed with severe cervical disease and underwent neck surgery on 8/23/2016. She developed an upper respiratory infection in late 3/2018 and was seen at an urgent care center and given antibiotics. A CXR revealed small pleural effusions. On 4/5/2018 she was began on furosemide and her dyspnea resolved. No exertional chest pain but shortness of breath. No palpitations or weak spells. No bleeding. Still works part time. Feeling well overall.    Chest Pain    This is a chronic problem. The problem has been resolved. Pertinent negatives include no abdominal pain, back pain, claudication, cough, diaphoresis, dizziness, exertional chest pressure, fever, headaches, hemoptysis, irregular heartbeat, leg pain, lower extremity edema,  malaise/fatigue, nausea, near-syncope, numbness, orthopnea, palpitations, PND, shortness of breath, sputum production, syncope, vomiting or weakness.   Her past medical history is significant for CHF and hyperlipidemia.   Pertinent negatives for past medical history include no diabetes and no muscle weakness.   Congestive Heart Failure   Presents for follow-up visit. Pertinent negatives include no abdominal pain, chest pain, chest pressure, claudication, edema, fatigue, muscle weakness, near-syncope, nocturia, orthopnea, palpitations, paroxysmal nocturnal dyspnea, shortness of breath or unexpected weight change. The symptoms have been stable.   Hypertension   This is a chronic problem. The current episode started more than 1 year ago. The problem is unchanged. The problem is controlled (usually 120-130/60-70 mmHg at home). Associated symptoms include neck pain. Pertinent negatives include no anxiety, blurred vision, chest pain, headaches, malaise/fatigue, orthopnea, palpitations, peripheral edema, PND, shortness of breath or sweats. There is no history of chronic renal disease.   Hyperlipidemia   This is a chronic problem. The current episode started more than 1 year ago. The problem is controlled. Recent lipid tests were reviewed and are normal. She has no history of chronic renal disease, diabetes, hypothyroidism, liver disease, obesity or nephrotic syndrome. Pertinent negatives include no chest pain, focal sensory loss, focal weakness, leg pain, myalgias or shortness of breath.       Review of Systems   Constitution: Negative for chills, diaphoresis, fatigue, fever, weakness, malaise/fatigue and unexpected weight change.   HENT: Negative for nosebleeds.    Eyes: Negative for blurred vision, vision loss in left eye and vision loss in right eye.   Cardiovascular: Negative for chest pain, claudication, dyspnea on exertion, irregular heartbeat, leg swelling, near-syncope, orthopnea, palpitations, paroxysmal nocturnal  dyspnea and syncope.   Respiratory: Negative for cough, hemoptysis, shortness of breath, sputum production and wheezing.    Endocrine: Negative for cold intolerance and heat intolerance.   Hematologic/Lymphatic: Negative for bleeding problem. Does not bruise/bleed easily.   Skin: Negative for dry skin and rash.   Musculoskeletal: Positive for arthritis, joint pain and neck pain. Negative for back pain, falls, gout, muscle cramps, muscle weakness and myalgias.   Gastrointestinal: Negative for abdominal pain, diarrhea, hematemesis, hematochezia, hemorrhoids, jaundice, melena, nausea and vomiting.   Genitourinary: Negative for dysuria, hematuria, menorrhagia and nocturia.   Neurological: Negative for dizziness, focal weakness, headaches, light-headedness, loss of balance, numbness, tremors and vertigo.   Psychiatric/Behavioral: Negative for altered mental status, depression and memory loss. The patient is not nervous/anxious.    Allergic/Immunologic: Negative for hives and persistent infections.       Current Outpatient Prescriptions on File Prior to Visit   Medication Sig Dispense Refill    alendronate (FOSAMAX) 70 MG tablet   2    amlodipine (NORVASC) 10 MG tablet Take 1 tablet (10 mg total) by mouth once daily. 90 tablet 3    amoxicillin-clavulanate 875-125mg (AUGMENTIN) 875-125 mg per tablet   0    aspirin (ECOTRIN) 81 MG EC tablet Take 1 tablet (81 mg total) by mouth once daily. 90 tablet 3    atorvastatin (LIPITOR) 40 MG tablet Take 1 tablet (40 mg total) by mouth once daily. 90 tablet 3    benzonatate (TESSALON) 100 MG capsule   0    calcium carbonate (OS-MIGUEL) 500 mg calcium (1,250 mg) chewable tablet Take 1 tablet by mouth once daily.      CALCIUM GLUBIONATE (CALCIONATE ORAL) Take by mouth.      erythromycin (ROMYCIN) ophthalmic ointment   0    furosemide (LASIX) 20 MG tablet Take 1 tablet (20 mg total) by mouth once daily. 90 tablet 3    gabapentin (NEURONTIN) 100 MG capsule   2     "hydrocodone-acetaminophen 7.5-325mg (NORCO) 7.5-325 mg per tablet TK 1 T PO  BID TO TID  0    metoprolol succinate (TOPROL-XL) 50 MG 24 hr tablet Take 1 tablet (50 mg total) by mouth once daily. 90 tablet 3    olmesartan (BENICAR) 40 MG tablet Take 1 tablet (40 mg total) by mouth once daily. 90 tablet 3    paroxetine (PAXIL) 10 MG tablet Take 10 mg by mouth every morning.      tizanidine (ZANAFLEX) 2 MG tablet   2    VESICARE 10 mg tablet TK 1 T PO QD  3    vitamin D 1000 units Tab Take 185 mg by mouth once daily.       No current facility-administered medications on file prior to visit.        /67   Pulse 66   Ht 4' 11" (1.499 m)   Wt 53.1 kg (117 lb)   BMI 23.63 kg/m²       Objective:     Physical Exam   Constitutional: She is oriented to person, place, and time. She appears well-developed and well-nourished. She does not appear ill. No distress.   HENT:   Head: Normocephalic and atraumatic.   Nose: Nose normal.   Eyes: Right eye exhibits no discharge. Left eye exhibits no discharge. Right conjunctiva is not injected. Left conjunctiva is not injected. Right pupil is round. Left pupil is round. Pupils are equal.   Neck: No JVD present. Carotid bruit is present (louder on the right than on the left side). No tracheal deviation present. No thyromegaly present.   Midline scar posteriorly.   Cardiovascular: Normal rate, regular rhythm, S1 normal and S2 normal.   No extrasystoles are present. PMI is not displaced.  Exam reveals gallop and S4. Exam reveals no S3.    Murmur heard.   Harsh midsystolic murmur is present with a grade of 2/6  at the upper right sternal border radiating to the neck  Pulses:       Carotid pulses are on the right side with bruit, and on the left side with bruit.       Radial pulses are 1+ on the right side, and 2+ on the left side.        Femoral pulses are 2+ on the right side, and 2+ on the left side.       Dorsalis pedis pulses are 1+ on the right side, and 1+ on the left " "side.        Posterior tibial pulses are 1+ on the right side, and 1+ on the left side.   Pulmonary/Chest: Effort normal and breath sounds normal.   Abdominal: Soft. Normal appearance. There is no hepatosplenomegaly. There is no tenderness.   Musculoskeletal:        Right ankle: She exhibits no swelling, no ecchymosis and no deformity.        Left ankle: She exhibits no swelling, no ecchymosis and no deformity.   Lymphadenopathy:        Head (right side): No submandibular adenopathy present.        Head (left side): No submandibular adenopathy present.     She has no cervical adenopathy.   Neurological: She is alert and oriented to person, place, and time. She is not disoriented. No cranial nerve deficit or sensory deficit.   Skin: Skin is warm, dry and intact. No rash noted. She is not diaphoretic. Nails show no clubbing.   Psychiatric: She has a normal mood and affect. Her speech is normal and behavior is normal. Judgment and thought content normal. Cognition and memory are normal.       Assessment:      1. History of chest pain    2. Heart failure, diastolic, chronic    3. Bilateral carotid artery stenosis    4. Subclavian artery stenosis    5. Essential hypertension    6. Hypercholesterolemia    7. History of neck surgery        Plan:     1. History of Chest Pain   9/23/2013: Stress Echo: 4:30 min. No CP. ECG negative. Echo "negative" with moderate LVH.   10/26/2015: Echo: Normal left ventricular size and systolic function. Moderate LVH. Sigmoid septum. Mild diastolic dysfunction. Mildly dilated LA. Mild aortic valve sclerosis.   12/17/2015: Episode of chest tightness.   12/21/2015: ECG: SB 57. T wave inversion V1-V6 that is similar to 2013.   1/21/2016: Stress MPI: 6:30 min. No CP. ECG negative. MPI negative.   Appeared atypical.   Now resolved.    2. Heart Failure, Diastolic, Chronic   4/11/2018: Echo: Normal left ventricular size and systolic function. Mild LVH. Sigmoid septum - 1.8 m/s - 12 mmHg. Mild " diastolic dysfunction. Mildly dilated LA. Mild aortic valve sclerosis.   4/4/2018: CXR: Small pleural effusions.   On furosemide 20 mg Q24.   Well compensated.    3. Carotid Artery Stenosis   10/26/2015: Carotid Duplex: BRITT: Moderate plaquing - <50%. LICA: Severe plaquing - 50-60%. Right vertebral: Retrograde flow.   10/17/2016: Carotid Duplex: BRITT: Moderate plaquing - <50%. LICA: Moderate plaquing - 1.2 m/s - 50-60%. Right vertebral: Retrograde flow.    10/16/2017: Carotid Duplex: BRITT: Moderate plaquing - <50%. LICA: Severe plaquing - 1.5 m/s - 50-60%.   10/2018: Plan next Carotid Duplex. Then yearly.   On aspirin 81 mg Q24.   Control risk factors.    4. Subclavian Artery Stenosis   2015: Clinical diagnosis.   10/26/2015: Carotid Duplex: Right vertebral: Retrograde flow.   Right arm: 115/55 mmHg. Left arm: 160/68 mmHg.   May have had a little heaviness in the right arm in the past but never dizziness.   Appears asymptomatic now.    5. Hypertension   1995: Diagnosed.   On metoprolol 50 mg Q24, amlodipine 10 mg Q24, olmesartan 40 mg Q24 and furosemide 20 mg Q24.   Keeping log at home.   Well controlled.       6. Hypercholesterolemia   2005: Began statin.   On atorvastatin 10 mg Q24.   12/28/2015: Chol 189. HDL 45. . .   On atorvastatin to 40 mg Q24.   2/8/2016: Chol 179. HDL 48. . .   On atorvastatin to 40 mg Q24.   Reasonably well controlled.    7. History of Cervical Neck Surgery   8/23/2016: Cervical neck surgery.    Dr. Ramsey Becker.     8. Primary Care   Dr. Sultana Guzman.    F/u 4 months.    Tammy Walotn M.D.

## 2018-11-16 ENCOUNTER — OFFICE VISIT (OUTPATIENT)
Dept: CARDIOLOGY | Facility: CLINIC | Age: 78
End: 2018-11-16
Attending: INTERNAL MEDICINE
Payer: MEDICARE

## 2018-11-16 VITALS
BODY MASS INDEX: 22.78 KG/M2 | SYSTOLIC BLOOD PRESSURE: 133 MMHG | DIASTOLIC BLOOD PRESSURE: 74 MMHG | WEIGHT: 113 LBS | HEART RATE: 79 BPM | HEIGHT: 59 IN

## 2018-11-16 DIAGNOSIS — I10 ESSENTIAL HYPERTENSION: ICD-10-CM

## 2018-11-16 DIAGNOSIS — I65.23 BILATERAL CAROTID ARTERY STENOSIS: ICD-10-CM

## 2018-11-16 DIAGNOSIS — I77.1 SUBCLAVIAN ARTERY STENOSIS: ICD-10-CM

## 2018-11-16 DIAGNOSIS — Z87.898 HISTORY OF CHEST PAIN: ICD-10-CM

## 2018-11-16 DIAGNOSIS — E78.00 HYPERCHOLESTEROLEMIA: ICD-10-CM

## 2018-11-16 DIAGNOSIS — I50.32 HEART FAILURE, DIASTOLIC, CHRONIC: ICD-10-CM

## 2018-11-16 DIAGNOSIS — Z98.890 HISTORY OF NECK SURGERY: ICD-10-CM

## 2018-11-16 PROCEDURE — 3075F SYST BP GE 130 - 139MM HG: CPT | Mod: CPTII,S$GLB,, | Performed by: INTERNAL MEDICINE

## 2018-11-16 PROCEDURE — 3078F DIAST BP <80 MM HG: CPT | Mod: CPTII,S$GLB,, | Performed by: INTERNAL MEDICINE

## 2018-11-16 PROCEDURE — 99214 OFFICE O/P EST MOD 30 MIN: CPT | Mod: S$GLB,,, | Performed by: INTERNAL MEDICINE

## 2018-11-16 PROCEDURE — 93000 ELECTROCARDIOGRAM COMPLETE: CPT | Mod: S$GLB,,, | Performed by: INTERNAL MEDICINE

## 2018-11-16 NOTE — PROGRESS NOTES
Subjective:     Ting Ward is a 77 y.o. female with hypertension and hypercholesterolemia. She had a Stress Echocardiogram in 2013 that was negative. She had T wave inversion in the anteroseptal leads at that time. On 10/26/2015 she had an Echocardiogram that revealed normal left ventricular size and systolic function with moderate left ventricular hypertrophy with a sigmoid septum. There was mild diastolic dysfunction and the aortic valve was mildy sclerotic. A Carotid Duplex on the same day revealed the BRITT to have moderate plaquing with a less than 50% stenosis. The LICA had severe plaquing with a 50-60% stenosis. The right vertebral had retrograde flow suggesting a subclavian steal syndrome. A blood pressure check revealed a systolic pressure in the left arm of 160 mmHg whereas it was 115 mmHg in the right arm. 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 On 1/21/2016 she underwent a Stress MPI during which she was able to do 6:30 minutes and there was no chest pain. The ECG was negative as was the MPI. She developed weakness in her arms and was diagnosed with severe cervical disease and underwent neck surgery on 8/23/2016. She developed an upper respiratory infection in late 3/2018 and was seen at an urgent care center and given antibiotics. A CXR revealed small pleural effusions. On 4/5/2018 she was began on furosemide and her dyspnea resolved. No exertional chest pain but shortness of breath. No palpitations or weak spells. No bleeding. 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 Feeling well overall.      Chest Pain    This is a chronic problem. The problem has been resolved. Associated symptoms include back pain. Pertinent negatives include no abdominal pain, claudication, cough, diaphoresis, dizziness, exertional chest pressure, fever,  headaches, hemoptysis, irregular heartbeat, leg pain, lower extremity edema, malaise/fatigue, nausea, near-syncope, numbness, orthopnea, palpitations, PND, shortness of breath, sputum production, syncope, vomiting or weakness.   Her past medical history is significant for CHF and hyperlipidemia.   Pertinent negatives for past medical history include no diabetes and no muscle weakness.   Congestive Heart Failure   Presents for follow-up visit. Pertinent negatives include no abdominal pain, chest pain, chest pressure, claudication, edema, fatigue, muscle weakness, near-syncope, nocturia, orthopnea, palpitations, paroxysmal nocturnal dyspnea, shortness of breath or unexpected weight change. The symptoms have been stable.   Hypertension   This is a chronic problem. The current episode started more than 1 year ago. The problem is unchanged. The problem is controlled (usually 120-130/60-70 mmHg at home). Associated symptoms include neck pain. Pertinent negatives include no anxiety, blurred vision, chest pain, headaches, malaise/fatigue, orthopnea, palpitations, peripheral edema, PND, shortness of breath or sweats. There is no history of chronic renal disease.   Hyperlipidemia   This is a chronic problem. The current episode started more than 1 year ago. The problem is controlled. Recent lipid tests were reviewed and are normal. She has no history of chronic renal disease, diabetes, hypothyroidism, liver disease, obesity or nephrotic syndrome. Pertinent negatives include no chest pain, focal sensory loss, focal weakness, leg pain, myalgias or shortness of breath.       Review of Systems   Constitution: Negative for chills, diaphoresis, fatigue, fever, weakness, malaise/fatigue and unexpected weight change.   HENT: Negative for nosebleeds.    Eyes: Negative for blurred vision, vision loss in left eye and vision loss in right eye.   Cardiovascular: Negative for chest pain, claudication, dyspnea on exertion, irregular  heartbeat, leg swelling, near-syncope, orthopnea, palpitations, paroxysmal nocturnal dyspnea and syncope.   Respiratory: Negative for cough, hemoptysis, shortness of breath, sputum production and wheezing.    Endocrine: Negative for cold intolerance and heat intolerance.   Hematologic/Lymphatic: Negative for bleeding problem. Does not bruise/bleed easily.   Skin: Negative for dry skin and rash.   Musculoskeletal: Positive for arthritis, back pain, joint pain and neck pain. Negative for falls, gout, muscle cramps, muscle weakness and myalgias.   Gastrointestinal: Negative for abdominal pain, diarrhea, hematemesis, hematochezia, hemorrhoids, jaundice, melena, nausea and vomiting.   Genitourinary: Negative for dysuria, hematuria, menorrhagia and nocturia.   Neurological: Negative for dizziness, focal weakness, headaches, light-headedness, loss of balance, numbness, tremors and vertigo.   Psychiatric/Behavioral: Negative for altered mental status, depression and memory loss. The patient is not nervous/anxious.    Allergic/Immunologic: Negative for hives and persistent infections.       Current Outpatient Medications on File Prior to Visit   Medication Sig Dispense Refill    alendronate (FOSAMAX) 70 MG tablet   2    amLODIPine (NORVASC) 10 MG tablet Take 1 tablet (10 mg total) by mouth once daily. 90 tablet 3    amoxicillin-clavulanate 875-125mg (AUGMENTIN) 875-125 mg per tablet   0    aspirin (ECOTRIN) 81 MG EC tablet Take 1 tablet (81 mg total) by mouth once daily. 90 tablet 3    atorvastatin (LIPITOR) 40 MG tablet Take 1 tablet (40 mg total) by mouth once daily. 90 tablet 3    benzonatate (TESSALON) 100 MG capsule   0    calcium carbonate (OS-MIGUEL) 500 mg calcium (1,250 mg) chewable tablet Take 1 tablet by mouth once daily.      CALCIUM GLUBIONATE (CALCIONATE ORAL) Take by mouth.      erythromycin (ROMYCIN) ophthalmic ointment   0    furosemide (LASIX) 20 MG tablet Take 1 tablet (20 mg total) by mouth once  "daily. 90 tablet 3    gabapentin (NEURONTIN) 100 MG capsule   2    hydrocodone-acetaminophen 7.5-325mg (NORCO) 7.5-325 mg per tablet TK 1 T PO  BID TO TID  0    metoprolol succinate (TOPROL-XL) 50 MG 24 hr tablet Take 1 tablet (50 mg total) by mouth once daily. 90 tablet 3    olmesartan (BENICAR) 40 MG tablet Take 1 tablet (40 mg total) by mouth once daily. 90 tablet 3    paroxetine (PAXIL) 10 MG tablet Take 10 mg by mouth every morning.      tizanidine (ZANAFLEX) 2 MG tablet   2    VESICARE 10 mg tablet TK 1 T PO QD  3    vitamin D 1000 units Tab Take 185 mg by mouth once daily.       No current facility-administered medications on file prior to visit.        /74   Pulse 79   Ht 4' 11" (1.499 m)   Wt 51.3 kg (113 lb)   BMI 22.82 kg/m²       Objective:     Physical Exam   Constitutional: She is oriented to person, place, and time. She appears well-developed and well-nourished. She does not appear ill. No distress.   HENT:   Head: Normocephalic and atraumatic.   Nose: Nose normal.   Eyes: Right eye exhibits no discharge. Left eye exhibits no discharge. Right conjunctiva is not injected. Left conjunctiva is not injected. Right pupil is round. Left pupil is round. Pupils are equal.   Neck: No JVD present. Carotid bruit is present (louder on the right than on the left side). No tracheal deviation present. No thyromegaly present.   Midline scar posteriorly.   Cardiovascular: Normal rate, regular rhythm, S1 normal and S2 normal.  No extrasystoles are present. PMI is not displaced. Exam reveals gallop and S4. Exam reveals no S3.   Murmur heard.   Harsh midsystolic murmur is present with a grade of 2/6 at the upper right sternal border radiating to the neck.  Pulses:       Carotid pulses are on the right side with bruit, and on the left side with bruit.       Radial pulses are 1+ on the right side, and 2+ on the left side.        Femoral pulses are 2+ on the right side, and 2+ on the left side.       " "Dorsalis pedis pulses are 1+ on the right side, and 1+ on the left side.        Posterior tibial pulses are 1+ on the right side, and 1+ on the left side.   Pulmonary/Chest: Effort normal and breath sounds normal.   Abdominal: Soft. Normal appearance. There is no hepatosplenomegaly. There is no tenderness.   Musculoskeletal:        Right ankle: She exhibits no swelling, no ecchymosis and no deformity.        Left ankle: She exhibits no swelling, no ecchymosis and no deformity.   Lymphadenopathy:        Head (right side): No submandibular adenopathy present.        Head (left side): No submandibular adenopathy present.     She has no cervical adenopathy.   Neurological: She is alert and oriented to person, place, and time. She is not disoriented. No cranial nerve deficit or sensory deficit.   Skin: Skin is warm, dry and intact. No rash noted. She is not diaphoretic. Nails show no clubbing.   Psychiatric: She has a normal mood and affect. Her speech is normal and behavior is normal. Judgment and thought content normal. Cognition and memory are normal.       Assessment:      1. History of chest pain    2. Heart failure, diastolic, chronic    3. Bilateral carotid artery stenosis    4. Subclavian artery stenosis    5. Essential hypertension    6. Hypercholesterolemia    7. History of neck surgery        Plan:     1. History of Chest Pain   9/23/2013: Stress Echo: 4:30 min. No CP. ECG negative. Echo "negative" with moderate LVH.   10/26/2015: Echo: Normal left ventricular size and systolic function. Moderate LVH. Sigmoid septum. Mild diastolic dysfunction. Mildly dilated LA. Mild aortic valve sclerosis.   12/17/2015: Episode of chest tightness.   12/21/2015: ECG: SB 57. T wave inversion V1-V6 that is similar to 2013.   1/21/2016: Stress MPI: 6:30 min. No CP. ECG negative. MPI negative.   Appeared chest pain was atypical.   Now resolved.    2. Heart Failure, Diastolic, Chronic   4/11/2018: Echo: Normal left ventricular " size and systolic function. Mild LVH. Sigmoid septum - 1.8 m/s - 12 mmHg. Mild diastolic dysfunction. Mildly dilated LA. Mild aortic valve sclerosis.   4/4/2018: CXR: Small pleural effusions.   On furosemide 20 mg Q24.   Well compensated.    3. Carotid Artery Stenosis   10/26/2015: Carotid Duplex: BIRTT: Moderate plaquing - <50%. LICA: Severe plaquing - 50-60%. Right vertebral: Retrograde flow.   10/17/2016: Carotid Duplex: BRITT: Moderate plaquing - <50%. LICA: Moderate plaquing - 1.2 m/s - 50-60%. Right vertebral: Retrograde flow.    10/16/2017: Carotid Duplex: BRITT: Moderate plaquing - <50%. LICA: Severe plaquing - 1.5 m/s - 50-60%.   10/2018: Plan was next Carotid Duplex. Then yearly. Do soon.   On aspirin 81 mg Q24.   Control risk factors.    4. Subclavian Artery Stenosis   2015: Clinical diagnosis.   10/26/2015: Carotid Duplex: Right vertebral: Retrograde flow.   Right arm: 115/55 mmHg. Left arm: 160/68 mmHg.   May have had a little heaviness in the right arm in the past but never dizziness.   Appears asymptomatic now.    5. Hypertension   1995: Diagnosed.   On metoprolol 50 mg Q24, amlodipine 10 mg Q24, olmesartan 40 mg Q24 and furosemide 20 mg Q24.   Keeping log at home.   Well controlled.       6. Hypercholesterolemia   2005: Began statin.   On atorvastatin 10 mg Q24.   12/28/2015: Chol 189. HDL 45. . .   On atorvastatin to 40 mg Q24.   2/8/2016: Chol 179. HDL 48. . .   On atorvastatin to 40 mg Q24.   Reasonably well controlled.    7. History of Cervical Neck Surgery   8/23/2016: Cervical neck surgery.   Continues to have pain.   Dr. Ramsey Becker.     8. Primary Care   Dr. Sultana Guzman.    F/u 4 months.    Tammy Walton M.D.      11/29/2018 8:23 PM, Addendum:    11/26/2018: Carotid Duplex: BRITT: Moderate plaquing - <50%. LICA: Moderate plaquing - 1.3 m/s - 50-60%. Right vertebral: Retrograde flow.     I discussed the above test result and the implications of the  findings over the phone.    Tammy Walton M.D.

## 2018-11-26 ENCOUNTER — CLINICAL SUPPORT (OUTPATIENT)
Dept: CARDIOLOGY | Facility: CLINIC | Age: 78
End: 2018-11-26
Payer: MEDICARE

## 2018-11-26 DIAGNOSIS — I65.29 CAROTID ARTERY STENOSIS: ICD-10-CM

## 2018-11-26 PROCEDURE — 93880 EXTRACRANIAL BILAT STUDY: CPT | Mod: S$GLB,,, | Performed by: INTERNAL MEDICINE

## 2019-04-04 DIAGNOSIS — I10 ESSENTIAL HYPERTENSION: ICD-10-CM

## 2019-04-04 RX ORDER — OLMESARTAN MEDOXOMIL 40 MG/1
TABLET ORAL
Qty: 90 TABLET | Refills: 0 | Status: SHIPPED | OUTPATIENT
Start: 2019-04-04 | End: 2019-04-22 | Stop reason: SDUPTHER

## 2019-04-22 ENCOUNTER — OFFICE VISIT (OUTPATIENT)
Dept: CARDIOLOGY | Facility: CLINIC | Age: 79
End: 2019-04-22
Attending: INTERNAL MEDICINE
Payer: MEDICARE

## 2019-04-22 VITALS
HEART RATE: 63 BPM | HEIGHT: 59 IN | BODY MASS INDEX: 23.59 KG/M2 | SYSTOLIC BLOOD PRESSURE: 130 MMHG | DIASTOLIC BLOOD PRESSURE: 65 MMHG | WEIGHT: 117 LBS

## 2019-04-22 DIAGNOSIS — Z98.890 HISTORY OF NECK SURGERY: ICD-10-CM

## 2019-04-22 DIAGNOSIS — I65.23 BILATERAL CAROTID ARTERY STENOSIS: ICD-10-CM

## 2019-04-22 DIAGNOSIS — I77.1 SUBCLAVIAN ARTERY STENOSIS: ICD-10-CM

## 2019-04-22 DIAGNOSIS — I10 ESSENTIAL HYPERTENSION: ICD-10-CM

## 2019-04-22 DIAGNOSIS — I50.32 HEART FAILURE, DIASTOLIC, CHRONIC: ICD-10-CM

## 2019-04-22 DIAGNOSIS — E78.00 HYPERCHOLESTEROLEMIA: ICD-10-CM

## 2019-04-22 DIAGNOSIS — Z87.898 HISTORY OF CHEST PAIN: ICD-10-CM

## 2019-04-22 PROCEDURE — 3075F SYST BP GE 130 - 139MM HG: CPT | Mod: CPTII,S$GLB,, | Performed by: INTERNAL MEDICINE

## 2019-04-22 PROCEDURE — 3078F PR MOST RECENT DIASTOLIC BLOOD PRESSURE < 80 MM HG: ICD-10-PCS | Mod: CPTII,S$GLB,, | Performed by: INTERNAL MEDICINE

## 2019-04-22 PROCEDURE — 3078F DIAST BP <80 MM HG: CPT | Mod: CPTII,S$GLB,, | Performed by: INTERNAL MEDICINE

## 2019-04-22 PROCEDURE — 3075F PR MOST RECENT SYSTOLIC BLOOD PRESS GE 130-139MM HG: ICD-10-PCS | Mod: CPTII,S$GLB,, | Performed by: INTERNAL MEDICINE

## 2019-04-22 PROCEDURE — 99214 PR OFFICE/OUTPT VISIT, EST, LEVL IV, 30-39 MIN: ICD-10-PCS | Mod: S$GLB,,, | Performed by: INTERNAL MEDICINE

## 2019-04-22 PROCEDURE — 99214 OFFICE O/P EST MOD 30 MIN: CPT | Mod: S$GLB,,, | Performed by: INTERNAL MEDICINE

## 2019-04-22 RX ORDER — FUROSEMIDE 20 MG/1
20 TABLET ORAL DAILY
Qty: 90 TABLET | Refills: 3 | Status: SHIPPED | OUTPATIENT
Start: 2019-04-22 | End: 2019-11-11 | Stop reason: SDUPTHER

## 2019-04-22 RX ORDER — OLMESARTAN MEDOXOMIL 40 MG/1
40 TABLET ORAL DAILY
Qty: 90 TABLET | Refills: 3 | Status: SHIPPED | OUTPATIENT
Start: 2019-04-22 | End: 2019-11-11

## 2019-04-22 RX ORDER — AMLODIPINE BESYLATE 10 MG/1
10 TABLET ORAL DAILY
Qty: 90 TABLET | Refills: 3 | Status: SHIPPED | OUTPATIENT
Start: 2019-04-22 | End: 2019-11-11

## 2019-04-22 RX ORDER — METOPROLOL SUCCINATE 50 MG/1
50 TABLET, EXTENDED RELEASE ORAL DAILY
Qty: 90 TABLET | Refills: 3 | Status: SHIPPED | OUTPATIENT
Start: 2019-04-22 | End: 2019-11-11 | Stop reason: SDUPTHER

## 2019-04-22 RX ORDER — ASPIRIN 81 MG/1
81 TABLET ORAL DAILY
Qty: 90 TABLET | Refills: 3 | COMMUNITY
Start: 2019-04-22 | End: 2019-11-11 | Stop reason: SDUPTHER

## 2019-04-22 RX ORDER — ATORVASTATIN CALCIUM 40 MG/1
40 TABLET, FILM COATED ORAL DAILY
Qty: 90 TABLET | Refills: 3 | Status: SHIPPED | OUTPATIENT
Start: 2019-04-22 | End: 2019-11-11

## 2019-04-22 NOTE — PROGRESS NOTES
Subjective:     Ting Ward is a 78 y.o. female with hypertension and hypercholesterolemia. She had a Stress Echocardiogram in 2013 that was negative. She had T wave inversion in the anteroseptal leads at that time. On 10/26/2015 she had an Echocardiogram that revealed normal left ventricular size and systolic function with moderate left ventricular hypertrophy with a sigmoid septum. There was mild diastolic dysfunction and the aortic valve was mildy sclerotic. A Carotid Duplex on the same day revealed the BRITT to have moderate plaquing with a less than 50% stenosis. The LICA had severe plaquing with a 50-60% stenosis. The right vertebral had retrograde flow suggesting a subclavian steal syndrome. A blood pressure check revealed a systolic pressure in the left arm of 160 mmHg whereas it was 115 mmHg in the right arm. 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 On 1/21/2016 she underwent a Stress MPI during which she was able to do 6:30 minutes and there was no chest pain. The ECG was negative as was the MPI. She developed weakness in her arms and was diagnosed with severe cervical disease and underwent neck surgery on 8/23/2016. She developed an upper respiratory infection in late 3/2018 and was seen at an urgent care center and given antibiotics. A CXR revealed small pleural effusions. On 4/5/2018 she was began on furosemide and her dyspnea resolved. No exertional chest pain but shortness of breath. No palpitations or weak spells. No bleeding. 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 She may get a bit tired in the right arm. Feeling well overall.      Chest Pain    This is a chronic problem. The problem has been resolved. Associated symptoms include back pain. Pertinent negatives include no abdominal pain, claudication, cough, diaphoresis, dizziness,  exertional chest pressure, fever, headaches, hemoptysis, irregular heartbeat, leg pain, lower extremity edema, malaise/fatigue, nausea, near-syncope, numbness, orthopnea, palpitations, PND, shortness of breath, sputum production, syncope, vomiting or weakness.   Her past medical history is significant for CHF and hyperlipidemia.   Pertinent negatives for past medical history include no diabetes and no muscle weakness.   Congestive Heart Failure   Presents for follow-up visit. Pertinent negatives include no abdominal pain, chest pain, chest pressure, claudication, edema, fatigue, muscle weakness, near-syncope, nocturia, orthopnea, palpitations, paroxysmal nocturnal dyspnea, shortness of breath or unexpected weight change. The symptoms have been stable.   Hypertension   This is a chronic problem. The current episode started more than 1 year ago. The problem is unchanged. The problem is controlled (usually 120-130/60-70 mmHg at home). Associated symptoms include neck pain. Pertinent negatives include no anxiety, blurred vision, chest pain, headaches, malaise/fatigue, orthopnea, palpitations, peripheral edema, PND, shortness of breath or sweats. There is no history of chronic renal disease.   Hyperlipidemia   This is a chronic problem. The current episode started more than 1 year ago. The problem is controlled. Recent lipid tests were reviewed and are normal. She has no history of chronic renal disease, diabetes, hypothyroidism, liver disease, obesity or nephrotic syndrome. Pertinent negatives include no chest pain, focal sensory loss, focal weakness, leg pain, myalgias or shortness of breath.       Review of Systems   Constitution: Negative for chills, diaphoresis, fatigue, fever, malaise/fatigue and unexpected weight change.   HENT: Negative for nosebleeds.    Eyes: Negative for blurred vision, vision loss in left eye and vision loss in right eye.   Cardiovascular: Negative for chest pain, claudication, dyspnea on  exertion, irregular heartbeat, leg swelling, near-syncope, orthopnea, palpitations, paroxysmal nocturnal dyspnea and syncope.   Respiratory: Negative for cough, hemoptysis, shortness of breath, sputum production and wheezing.    Endocrine: Negative for cold intolerance and heat intolerance.   Hematologic/Lymphatic: Negative for bleeding problem. Does not bruise/bleed easily.   Skin: Negative for dry skin and rash.   Musculoskeletal: Positive for arthritis, back pain, joint pain and neck pain. Negative for falls, gout, muscle cramps, muscle weakness and myalgias.   Gastrointestinal: Negative for abdominal pain, diarrhea, hematemesis, hematochezia, hemorrhoids, jaundice, melena, nausea and vomiting.   Genitourinary: Negative for dysuria, hematuria, menorrhagia and nocturia.   Neurological: Negative for dizziness, focal weakness, headaches, light-headedness, loss of balance, numbness, tremors, vertigo and weakness.   Psychiatric/Behavioral: Negative for altered mental status, depression and memory loss. The patient is not nervous/anxious.    Allergic/Immunologic: Negative for hives and persistent infections.       Current Outpatient Medications on File Prior to Visit   Medication Sig Dispense Refill    alendronate (FOSAMAX) 70 MG tablet   2    amLODIPine (NORVASC) 10 MG tablet Take 1 tablet (10 mg total) by mouth once daily. 90 tablet 3    amoxicillin-clavulanate 875-125mg (AUGMENTIN) 875-125 mg per tablet   0    aspirin (ECOTRIN) 81 MG EC tablet Take 1 tablet (81 mg total) by mouth once daily. 90 tablet 3    atorvastatin (LIPITOR) 40 MG tablet Take 1 tablet (40 mg total) by mouth once daily. 90 tablet 3    benzonatate (TESSALON) 100 MG capsule   0    calcium carbonate (OS-MIGUEL) 500 mg calcium (1,250 mg) chewable tablet Take 1 tablet by mouth once daily.      CALCIUM GLUBIONATE (CALCIONATE ORAL) Take by mouth.      erythromycin (ROMYCIN) ophthalmic ointment   0    furosemide (LASIX) 20 MG tablet Take 1 tablet  "(20 mg total) by mouth once daily. 90 tablet 3    gabapentin (NEURONTIN) 100 MG capsule   2    hydrocodone-acetaminophen 7.5-325mg (NORCO) 7.5-325 mg per tablet TK 1 T PO  BID TO TID  0    metoprolol succinate (TOPROL-XL) 50 MG 24 hr tablet Take 1 tablet (50 mg total) by mouth once daily. 90 tablet 3    olmesartan (BENICAR) 40 MG tablet TAKE 1 TABLET(40 MG) BY MOUTH EVERY DAY 90 tablet 0    paroxetine (PAXIL) 10 MG tablet Take 10 mg by mouth every morning.      tizanidine (ZANAFLEX) 2 MG tablet   2    VESICARE 10 mg tablet TK 1 T PO QD  3    vitamin D 1000 units Tab Take 185 mg by mouth once daily.       No current facility-administered medications on file prior to visit.        /65   Pulse 63   Ht 4' 11" (1.499 m)   Wt 53.1 kg (117 lb)   BMI 23.63 kg/m²       Objective:     Physical Exam   Constitutional: She is oriented to person, place, and time. She appears well-developed and well-nourished. She does not appear ill. No distress.   HENT:   Head: Normocephalic and atraumatic.   Nose: Nose normal.   Eyes: Right eye exhibits no discharge. Left eye exhibits no discharge. Right conjunctiva is not injected. Left conjunctiva is not injected. Right pupil is round. Left pupil is round. Pupils are equal.   Neck: No JVD present. Carotid bruit is present (louder on the right than on the left side). No tracheal deviation present. No thyromegaly present.   Midline scar posteriorly.   Cardiovascular: Normal rate, regular rhythm, S1 normal and S2 normal.  No extrasystoles are present. PMI is not displaced. Exam reveals gallop and S4. Exam reveals no S3.   Murmur heard.   Harsh midsystolic murmur is present with a grade of 2/6 at the upper right sternal border radiating to the neck.  Pulses:       Carotid pulses are on the right side with bruit, and on the left side with bruit.       Radial pulses are 1+ on the right side, and 2+ on the left side.        Femoral pulses are 2+ on the right side, and 2+ on the " "left side.       Dorsalis pedis pulses are 1+ on the right side, and 1+ on the left side.        Posterior tibial pulses are 1+ on the right side, and 1+ on the left side.   Pulmonary/Chest: Effort normal and breath sounds normal.   Abdominal: Soft. Normal appearance. There is no hepatosplenomegaly. There is no tenderness.   Musculoskeletal:        Right ankle: She exhibits no swelling, no ecchymosis and no deformity.        Left ankle: She exhibits no swelling, no ecchymosis and no deformity.   Lymphadenopathy:        Head (right side): No submandibular adenopathy present.        Head (left side): No submandibular adenopathy present.     She has no cervical adenopathy.   Neurological: She is alert and oriented to person, place, and time. She is not disoriented. No cranial nerve deficit.   Skin: Skin is warm, dry and intact. No rash noted. She is not diaphoretic.   Psychiatric: She has a normal mood and affect. Her speech is normal and behavior is normal. Judgment and thought content normal. Cognition and memory are normal.       Assessment:      1. History of chest pain    2. Heart failure, diastolic, chronic    3. Bilateral carotid artery stenosis    4. Subclavian artery stenosis    5. Essential hypertension    6. Hypercholesterolemia    7. History of neck surgery        Plan:     1. History of Chest Pain   9/23/2013: Stress Echo: 4:30 min. No CP. ECG negative. Echo "negative" with moderate LVH.   10/26/2015: Echo: Normal left ventricular size and systolic function. Moderate LVH. Sigmoid septum. Mild diastolic dysfunction. Mildly dilated LA. Mild aortic valve sclerosis.   12/17/2015: Episode of chest tightness.   12/21/2015: ECG: SB 57. T wave inversion V1-V6 that is similar to 2013.   1/21/2016: Stress MPI: 6:30 min. No CP. ECG negative. MPI negative.   Appeared chest pain was atypical.   Now resolved.    2. Heart Failure, Diastolic, Chronic   4/11/2018: Echo: Normal left ventricular size and systolic function. " Mild LVH. Sigmoid septum - 1.8 m/s - 12 mmHg. Mild diastolic dysfunction. Mildly dilated LA. Mild aortic valve sclerosis.   4/4/2018: CXR: Small pleural effusions.   On furosemide 20 mg Q24.   Well compensated.    3. Carotid Artery Stenosis   10/26/2015: Carotid Duplex: BRITT: Moderate plaquing - <50%. LICA: Severe plaquing - 50-60%. Right vertebral: Retrograde flow.   10/17/2016: Carotid Duplex: BRITT: Moderate plaquing - <50%. LICA: Moderate plaquing - 1.2 m/s - 50-60%. Right vertebral: Retrograde flow.    10/16/2017: Carotid Duplex: BRITT: Moderate plaquing - <50%. LICA: Severe plaquing - 1.5 m/s - 50-60%.   11/26/2018: Carotid Duplex: BRITT: Moderate plaquing - <50%. LICA: Moderate plaquing - 1.3 m/s - 50-60%. Right vertebral: Retrograde flow.   11/2019: Plan was next Carotid Duplex. Then yearly.   On aspirin 81 mg Q24.   Control risk factors.    4. Subclavian Artery Stenosis   2015: Clinical diagnosis.   10/26/2015: Carotid Duplex: Right vertebral: Retrograde flow.   Right arm: 115/55 mmHg. Left arm: 160/68 mmHg.   May have had a little heaviness in the right arm in the past but never dizziness.   Wants conservative care.    5. Hypertension   1995: Diagnosed.   On metoprolol 50 mg Q24, amlodipine 10 mg Q24, olmesartan 40 mg Q24 and furosemide 20 mg Q24.   Keeping log at home.   Well controlled.       6. Hypercholesterolemia   2005: Began statin.   On atorvastatin 10 mg Q24.   12/28/2015: Chol 189. HDL 45. . .   On atorvastatin to 40 mg Q24.   2/8/2016: Chol 179. HDL 48. . .   On atorvastatin to 40 mg Q24.   Reasonably well controlled.    7. History of Cervical Neck Surgery   8/23/2016: Cervical neck surgery.   Continues to have pain.   Dr. Ramsey Becker.     8. Primary Care   Dr. Sultana Guzman.    F/u 6 months.    Tammy Walton M.D.

## 2019-07-17 DIAGNOSIS — E78.00 HYPERCHOLESTEROLEMIA: ICD-10-CM

## 2019-07-17 RX ORDER — ATORVASTATIN CALCIUM 40 MG/1
TABLET, FILM COATED ORAL
Qty: 90 TABLET | Refills: 0 | Status: SHIPPED | OUTPATIENT
Start: 2019-07-17 | End: 2019-11-02 | Stop reason: SDUPTHER

## 2019-11-01 DIAGNOSIS — I10 ESSENTIAL HYPERTENSION: ICD-10-CM

## 2019-11-01 RX ORDER — OLMESARTAN MEDOXOMIL 40 MG/1
TABLET ORAL
Qty: 90 TABLET | Refills: 0 | Status: SHIPPED | OUTPATIENT
Start: 2019-11-01 | End: 2019-11-11

## 2019-11-01 RX ORDER — AMLODIPINE BESYLATE 10 MG/1
TABLET ORAL
Qty: 90 TABLET | Refills: 0 | Status: SHIPPED | OUTPATIENT
Start: 2019-11-01 | End: 2019-11-11

## 2019-11-01 RX ORDER — OLMESARTAN MEDOXOMIL 20 MG/1
TABLET ORAL
Qty: 180 TABLET | Refills: 0 | Status: SHIPPED | OUTPATIENT
Start: 2019-11-01 | End: 2019-11-11

## 2019-11-02 DIAGNOSIS — E78.00 HYPERCHOLESTEROLEMIA: ICD-10-CM

## 2019-11-04 RX ORDER — ATORVASTATIN CALCIUM 40 MG/1
TABLET, FILM COATED ORAL
Qty: 90 TABLET | Refills: 0 | Status: SHIPPED | OUTPATIENT
Start: 2019-11-04 | End: 2019-11-11

## 2019-11-11 ENCOUNTER — OFFICE VISIT (OUTPATIENT)
Dept: CARDIOLOGY | Facility: CLINIC | Age: 79
End: 2019-11-11
Attending: INTERNAL MEDICINE
Payer: MEDICARE

## 2019-11-11 VITALS
SYSTOLIC BLOOD PRESSURE: 127 MMHG | HEIGHT: 59 IN | DIASTOLIC BLOOD PRESSURE: 62 MMHG | WEIGHT: 121 LBS | BODY MASS INDEX: 24.39 KG/M2 | HEART RATE: 62 BPM

## 2019-11-11 DIAGNOSIS — E78.00 HYPERCHOLESTEROLEMIA: ICD-10-CM

## 2019-11-11 DIAGNOSIS — I50.32 HEART FAILURE, DIASTOLIC, CHRONIC: ICD-10-CM

## 2019-11-11 DIAGNOSIS — Z98.890 HISTORY OF NECK SURGERY: ICD-10-CM

## 2019-11-11 DIAGNOSIS — I77.1 SUBCLAVIAN ARTERY STENOSIS: ICD-10-CM

## 2019-11-11 DIAGNOSIS — Z87.898 HISTORY OF CHEST PAIN: ICD-10-CM

## 2019-11-11 DIAGNOSIS — I65.23 BILATERAL CAROTID ARTERY STENOSIS: ICD-10-CM

## 2019-11-11 DIAGNOSIS — I10 ESSENTIAL HYPERTENSION: ICD-10-CM

## 2019-11-11 PROCEDURE — 93000 PR ELECTROCARDIOGRAM, COMPLETE: ICD-10-PCS | Mod: S$GLB,,, | Performed by: INTERNAL MEDICINE

## 2019-11-11 PROCEDURE — 3074F SYST BP LT 130 MM HG: CPT | Mod: CPTII,S$GLB,, | Performed by: INTERNAL MEDICINE

## 2019-11-11 PROCEDURE — 99214 PR OFFICE/OUTPT VISIT, EST, LEVL IV, 30-39 MIN: ICD-10-PCS | Mod: 25,S$GLB,, | Performed by: INTERNAL MEDICINE

## 2019-11-11 PROCEDURE — 99214 OFFICE O/P EST MOD 30 MIN: CPT | Mod: 25,S$GLB,, | Performed by: INTERNAL MEDICINE

## 2019-11-11 PROCEDURE — 3078F DIAST BP <80 MM HG: CPT | Mod: CPTII,S$GLB,, | Performed by: INTERNAL MEDICINE

## 2019-11-11 PROCEDURE — 3074F PR MOST RECENT SYSTOLIC BLOOD PRESSURE < 130 MM HG: ICD-10-PCS | Mod: CPTII,S$GLB,, | Performed by: INTERNAL MEDICINE

## 2019-11-11 PROCEDURE — 93000 ELECTROCARDIOGRAM COMPLETE: CPT | Mod: S$GLB,,, | Performed by: INTERNAL MEDICINE

## 2019-11-11 PROCEDURE — 3078F PR MOST RECENT DIASTOLIC BLOOD PRESSURE < 80 MM HG: ICD-10-PCS | Mod: CPTII,S$GLB,, | Performed by: INTERNAL MEDICINE

## 2019-11-11 RX ORDER — ATORVASTATIN CALCIUM 40 MG/1
40 TABLET, FILM COATED ORAL DAILY
Qty: 90 TABLET | Refills: 3 | Status: SHIPPED | OUTPATIENT
Start: 2019-11-11 | End: 2020-04-30

## 2019-11-11 RX ORDER — ASPIRIN 81 MG/1
81 TABLET ORAL DAILY
Qty: 90 TABLET | Refills: 3 | COMMUNITY
Start: 2019-11-11 | End: 2020-07-06 | Stop reason: SDUPTHER

## 2019-11-11 RX ORDER — AMLODIPINE BESYLATE 10 MG/1
10 TABLET ORAL DAILY
Qty: 90 TABLET | Refills: 3 | Status: SHIPPED | OUTPATIENT
Start: 2019-11-11 | End: 2020-04-30

## 2019-11-11 RX ORDER — METOPROLOL SUCCINATE 50 MG/1
50 TABLET, EXTENDED RELEASE ORAL DAILY
Qty: 90 TABLET | Refills: 3 | Status: SHIPPED | OUTPATIENT
Start: 2019-11-11 | End: 2020-07-06 | Stop reason: SDUPTHER

## 2019-11-11 RX ORDER — FUROSEMIDE 20 MG/1
20 TABLET ORAL DAILY
Qty: 90 TABLET | Refills: 3 | Status: SHIPPED | OUTPATIENT
Start: 2019-11-11 | End: 2020-07-06 | Stop reason: SDUPTHER

## 2019-11-11 RX ORDER — OLMESARTAN MEDOXOMIL 40 MG/1
40 TABLET ORAL DAILY
Qty: 90 TABLET | Refills: 3 | Status: SHIPPED | OUTPATIENT
Start: 2019-11-11 | End: 2020-04-30

## 2019-11-11 NOTE — PROGRESS NOTES
Subjective:     Ting Ward is a 78 y.o. female with hypertension and hypercholesterolemia. She had a Stress Echocardiogram in 2013 that was negative. She had T wave inversion in the anteroseptal leads at that time. On 10/26/2015 she had an Echocardiogram that revealed normal left ventricular size and systolic function with moderate left ventricular hypertrophy with a sigmoid septum. There was mild diastolic dysfunction and the aortic valve was mildy sclerotic. A Carotid Duplex on the same day revealed the BRITT to have moderate plaquing with a less than 50% stenosis. The LICA had severe plaquing with a 50-60% stenosis. The right vertebral had retrograde flow suggesting a subclavian steal syndrome. A blood pressure check revealed a systolic pressure in the left arm of 160 mmHg whereas it was 115 mmHg in the right arm. 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 On 1/21/2016 she underwent a Stress MPI during which she was able to do 6:30 minutes and there was no chest pain. The ECG was negative as was the MPI. She developed weakness in her arms and was diagnosed with severe cervical disease and underwent neck surgery on 8/23/2016. She developed an upper respiratory infection in late 3/2018 and was seen at an urgent care center and given antibiotics. A CXR revealed small pleural effusions. On 4/5/2018 she was began on furosemide and her dyspnea resolved. No exertional chest pain but shortness of breath. No palpitations or weak spells. No bleeding. 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 She may get a bit tired in the right arm. Feeling well overall.      Chest Pain    This is a chronic problem. The problem has been resolved. Associated symptoms include back pain. Pertinent negatives include no abdominal pain, claudication, cough, diaphoresis, dizziness,  exertional chest pressure, fever, headaches, hemoptysis, irregular heartbeat, leg pain, lower extremity edema, malaise/fatigue, nausea, near-syncope, numbness, orthopnea, palpitations, PND, shortness of breath, sputum production, syncope, vomiting or weakness.   Her past medical history is significant for CHF and hyperlipidemia.   Pertinent negatives for past medical history include no diabetes and no muscle weakness.   Congestive Heart Failure   Presents for follow-up visit. Associated symptoms include chest pain. Pertinent negatives include no abdominal pain, chest pressure, claudication, edema, fatigue, muscle weakness, near-syncope, nocturia, orthopnea, palpitations, paroxysmal nocturnal dyspnea, shortness of breath or unexpected weight change. The symptoms have been stable.   Hypertension   This is a chronic problem. The current episode started more than 1 year ago. The problem is unchanged. The problem is controlled (usually 120-130/60-70 mmHg at home). Associated symptoms include chest pain and neck pain. Pertinent negatives include no anxiety, blurred vision, headaches, malaise/fatigue, orthopnea, palpitations, peripheral edema, PND, shortness of breath or sweats. There is no history of chronic renal disease.   Hyperlipidemia   This is a chronic problem. The current episode started more than 1 year ago. The problem is controlled. Recent lipid tests were reviewed and are normal. She has no history of chronic renal disease, diabetes, hypothyroidism, liver disease, obesity or nephrotic syndrome. Associated symptoms include chest pain. Pertinent negatives include no focal sensory loss, focal weakness, leg pain, myalgias or shortness of breath.       Review of Systems   Constitution: Negative for chills, diaphoresis, fatigue, fever, malaise/fatigue and unexpected weight change.   HENT: Negative for nosebleeds.    Eyes: Negative for blurred vision, vision loss in left eye and vision loss in right eye.    Cardiovascular: Positive for chest pain. Negative for claudication, dyspnea on exertion, irregular heartbeat, leg swelling, near-syncope, orthopnea, palpitations, paroxysmal nocturnal dyspnea and syncope.   Respiratory: Negative for cough, hemoptysis, shortness of breath, sputum production and wheezing.    Endocrine: Negative for cold intolerance and heat intolerance.   Hematologic/Lymphatic: Negative for bleeding problem. Does not bruise/bleed easily.   Skin: Negative for dry skin and rash.   Musculoskeletal: Positive for arthritis, back pain, joint pain and neck pain. Negative for falls, gout, muscle cramps, muscle weakness and myalgias.   Gastrointestinal: Negative for abdominal pain, diarrhea, hematemesis, hematochezia, hemorrhoids, jaundice, melena, nausea and vomiting.   Genitourinary: Negative for dysuria, hematuria, menorrhagia and nocturia.   Neurological: Negative for dizziness, focal weakness, headaches, light-headedness, loss of balance, numbness, tremors, vertigo and weakness.   Psychiatric/Behavioral: Negative for altered mental status, depression and memory loss. The patient is not nervous/anxious.    Allergic/Immunologic: Negative for hives and persistent infections.       Current Outpatient Medications on File Prior to Visit   Medication Sig Dispense Refill    alendronate (FOSAMAX) 70 MG tablet   2    amLODIPine (NORVASC) 10 MG tablet Take 1 tablet (10 mg total) by mouth once daily. 90 tablet 3    amLODIPine (NORVASC) 10 MG tablet TAKE 1 TABLET(10 MG) BY MOUTH EVERY DAY 90 tablet 0    amoxicillin-clavulanate 875-125mg (AUGMENTIN) 875-125 mg per tablet   0    aspirin (ECOTRIN) 81 MG EC tablet Take 1 tablet (81 mg total) by mouth once daily. 90 tablet 3    atorvastatin (LIPITOR) 40 MG tablet Take 1 tablet (40 mg total) by mouth once daily. 90 tablet 3    atorvastatin (LIPITOR) 40 MG tablet TAKE 1 TABLET(40 MG) BY MOUTH EVERY DAY 90 tablet 0    benzonatate (TESSALON) 100 MG capsule   0     "calcium carbonate (OS-MIGUEL) 500 mg calcium (1,250 mg) chewable tablet Take 1 tablet by mouth once daily.      CALCIUM GLUBIONATE (CALCIONATE ORAL) Take by mouth.      erythromycin (ROMYCIN) ophthalmic ointment   0    furosemide (LASIX) 20 MG tablet Take 1 tablet (20 mg total) by mouth once daily. 90 tablet 3    gabapentin (NEURONTIN) 100 MG capsule   2    hydrocodone-acetaminophen 7.5-325mg (NORCO) 7.5-325 mg per tablet TK 1 T PO  BID TO TID  0    metoprolol succinate (TOPROL-XL) 50 MG 24 hr tablet Take 1 tablet (50 mg total) by mouth once daily. 90 tablet 3    olmesartan (BENICAR) 20 MG tablet TAKE 2 TABLETS BY MOUTH DAILY 180 tablet 0    olmesartan (BENICAR) 40 MG tablet Take 1 tablet (40 mg total) by mouth once daily. 90 tablet 3    olmesartan (BENICAR) 40 MG tablet TAKE 1 TABLET(40 MG) BY MOUTH EVERY DAY 90 tablet 0    paroxetine (PAXIL) 10 MG tablet Take 10 mg by mouth every morning.      tizanidine (ZANAFLEX) 2 MG tablet   2    VESICARE 10 mg tablet TK 1 T PO QD  3    vitamin D 1000 units Tab Take 185 mg by mouth once daily.       No current facility-administered medications on file prior to visit.        /62   Pulse 62   Ht 4' 11" (1.499 m)   Wt 54.9 kg (121 lb)   BMI 24.44 kg/m²       Objective:     Physical Exam   Constitutional: She is oriented to person, place, and time. She appears well-developed and well-nourished. She does not appear ill. No distress.   HENT:   Head: Normocephalic and atraumatic.   Nose: Nose normal.   Eyes: Right eye exhibits no discharge. Left eye exhibits no discharge. Right conjunctiva is not injected. Left conjunctiva is not injected. Right pupil is round. Left pupil is round. Pupils are equal.   Neck: No JVD present. Carotid bruit is present (louder on the right than on the left side). No tracheal deviation present. No thyromegaly present.   Midline scar posteriorly.   Cardiovascular: Normal rate, regular rhythm, S1 normal and S2 normal.  No extrasystoles " "are present. PMI is not displaced. Exam reveals gallop and S4. Exam reveals no S3.   Murmur heard.   Harsh midsystolic murmur is present with a grade of 2/6 at the upper right sternal border radiating to the neck.  Pulses:       Carotid pulses are on the right side with bruit, and on the left side with bruit.       Radial pulses are 1+ on the right side, and 2+ on the left side.        Femoral pulses are 2+ on the right side, and 2+ on the left side.       Dorsalis pedis pulses are 1+ on the right side, and 1+ on the left side.        Posterior tibial pulses are 1+ on the right side, and 1+ on the left side.   Pulmonary/Chest: Effort normal and breath sounds normal.   Abdominal: Soft. Normal appearance. There is no hepatosplenomegaly. There is no tenderness.   Musculoskeletal:        Right ankle: She exhibits no swelling, no ecchymosis and no deformity.        Left ankle: She exhibits no swelling, no ecchymosis and no deformity.   Lymphadenopathy:        Head (right side): No submandibular adenopathy present.        Head (left side): No submandibular adenopathy present.     She has no cervical adenopathy.   Neurological: She is alert and oriented to person, place, and time. She is not disoriented. No cranial nerve deficit.   Skin: Skin is warm, dry and intact. No rash noted. She is not diaphoretic.   Psychiatric: She has a normal mood and affect. Her speech is normal and behavior is normal. Judgment and thought content normal. Cognition and memory are normal.       Assessment:      1. History of chest pain    2. Heart failure, diastolic, chronic    3. Bilateral carotid artery stenosis    4. Subclavian artery stenosis    5. Essential hypertension    6. Hypercholesterolemia    7. History of neck surgery        Plan:     1. History of Chest Pain   9/23/2013: Stress Echo: 4:30 min. No CP. ECG negative. Echo "negative" with moderate LVH.   10/26/2015: Echo: Normal left ventricular size and systolic function. Moderate " LVH. Sigmoid septum. Mild diastolic dysfunction. Mildly dilated LA. Mild aortic valve sclerosis.   12/17/2015: Episode of chest tightness.   12/21/2015: ECG: SB 57. T wave inversion V1-V6 that is similar to 2013.   1/21/2016: Stress MPI: 6:30 min. No CP. ECG negative. MPI negative.   Appeared chest pain was atypical.   Now resolved.    2. Heart Failure, Diastolic, Chronic   4/11/2018: Echo: Normal left ventricular size and systolic function. Mild LVH. Sigmoid septum - 1.8 m/s - 12 mmHg. Mild diastolic dysfunction. Mildly dilated LA. Mild aortic valve sclerosis.   4/4/2018: CXR: Small pleural effusions.   On furosemide 20 mg Q24.   Well compensated.    3. Carotid Artery Stenosis   10/26/2015: Carotid Duplex: BRITT: Moderate plaquing - <50%. LICA: Severe plaquing - 50-60%. Right vertebral: Retrograde flow.   10/17/2016: Carotid Duplex: BRITT: Moderate plaquing - <50%. LICA: Moderate plaquing - 1.2 m/s - 50-60%. Right vertebral: Retrograde flow.    10/16/2017: Carotid Duplex: BRITT: Moderate plaquing - <50%. LICA: Severe plaquing - 1.5 m/s - 50-60%.   11/26/2018: Carotid Duplex: BRITT: Moderate plaquing - <50%. LICA: Moderate plaquing - 1.3 m/s - 50-60%. Right vertebral: Retrograde flow.   11/2019: Plan was next Carotid Duplex. Then yearly.   On aspirin 81 mg Q24.1   Control risk factors.    4. Subclavian Artery Stenosis   2015: Clinical diagnosis.   10/26/2015: Carotid Duplex: Right vertebral: Retrograde flow.   Right arm: 115/55 mmHg. Left arm: 160/68 mmHg.   May have had a little heaviness in the right arm in the past but never dizziness.   Wants conservative care.    5. Hypertension   1995: Diagnosed.   On metoprolol 50 mg Q24, amlodipine 10 mg Q24, olmesartan 40 mg Q24 and furosemide 20 mg Q24.   Keeping log at home.   Well controlled.       6. Hypercholesterolemia   2005: Began statin.   On atorvastatin 10 mg Q24.   12/28/2015: Chol 189. HDL 45. . .   On atorvastatin to 40 mg Q24.   2/8/2016: Chol 179.  HDL 48. . .   On atorvastatin to 40 mg Q24.   Reasonably well controlled.    7. History of Cervical Neck Surgery   8/23/2016: Cervical neck surgery.   Continues to have pain.   Dr. Ramsey Becker.     8. Primary Care   Dr. Sultana Guzman.    F/u 6 months.    Tammy Walton M.D.      11/19/2019 7:31 PM, Addendum:    11/19/2019: Carotid Duplex: BRITT: Moderate plaquing - 1.1 m/s - <50%. LICA: Moderate plaquing - 1.5 m/s - 50-60%. Right vertebral: Retrograde flow.    I discussed the above test result and the implications of the findings over the phone.    Tammy Walton M.D.

## 2019-11-19 ENCOUNTER — CLINICAL SUPPORT (OUTPATIENT)
Dept: CARDIOLOGY | Facility: CLINIC | Age: 79
End: 2019-11-19
Attending: INTERNAL MEDICINE
Payer: MEDICARE

## 2019-11-19 LAB
LEFT CBA DIAS: 31 CM/S
LEFT CBA SYS: 62 CM/S
LEFT CCA DIST DIAS: 31 CM/S
LEFT CCA DIST SYS: 73 CM/S
LEFT CCA MID DIAS: 28 CM/S
LEFT CCA MID SYS: 63 CM/S
LEFT CCA PROX DIAS: 32 CM/S
LEFT CCA PROX SYS: 85 CM/S
LEFT ECA DIAS: 24 CM/S
LEFT ECA SYS: 92 CM/S
LEFT ICA DIST DIAS: 81 CM/S
LEFT ICA DIST SYS: 147 CM/S
LEFT ICA MID DIAS: 71 CM/S
LEFT ICA MID SYS: 150 CM/S
LEFT ICA PROX DIAS: 63 CM/S
LEFT ICA PROX SYS: 136 CM/S
LEFT VERTEBRAL DIAS: 28 CM/S
LEFT VERTEBRAL SYS: 60 CM/S
OHS CV CAROTID RIGHT ICA EDV HIGHEST: 68
OHS CV CAROTID ULTRASOUND LEFT ICA/CCA RATIO: 1.76
OHS CV CAROTID ULTRASOUND RIGHT ICA/CCA RATIO: 1.68
OHS CV PV CAROTID LEFT HIGHEST CCA: 85
OHS CV PV CAROTID LEFT HIGHEST ICA: 150
OHS CV PV CAROTID RIGHT HIGHEST CCA: 71
OHS CV PV CAROTID RIGHT HIGHEST ICA: 119
OHS CV US CAROTID LEFT HIGHEST EDV: 81
RIGHT CBA DIAS: 27 CM/S
RIGHT CBA SYS: 60 CM/S
RIGHT CCA DIST DIAS: 29 CM/S
RIGHT CCA DIST SYS: 66 CM/S
RIGHT CCA MID DIAS: 30 CM/S
RIGHT CCA MID SYS: 67 CM/S
RIGHT CCA PROX DIAS: 26 CM/S
RIGHT CCA PROX SYS: 71 CM/S
RIGHT ECA DIAS: 24 CM/S
RIGHT ECA SYS: 95 CM/S
RIGHT ICA DIST DIAS: 59 CM/S
RIGHT ICA DIST SYS: 114 CM/S
RIGHT ICA MID DIAS: 68 CM/S
RIGHT ICA MID SYS: 119 CM/S
RIGHT ICA PROX DIAS: 47 CM/S
RIGHT ICA PROX SYS: 88 CM/S
RIGHT VERTEBRAL DIAS: 25 CM/S
RIGHT VERTEBRAL SYS: 73 CM/S

## 2020-04-30 DIAGNOSIS — E78.00 HYPERCHOLESTEROLEMIA: ICD-10-CM

## 2020-04-30 DIAGNOSIS — I10 ESSENTIAL HYPERTENSION: ICD-10-CM

## 2020-04-30 RX ORDER — ATORVASTATIN CALCIUM 40 MG/1
TABLET, FILM COATED ORAL
Qty: 90 TABLET | Refills: 3 | Status: SHIPPED | OUTPATIENT
Start: 2020-04-30 | End: 2020-07-06 | Stop reason: SDUPTHER

## 2020-04-30 RX ORDER — AMLODIPINE BESYLATE 10 MG/1
TABLET ORAL
Qty: 90 TABLET | Refills: 3 | Status: SHIPPED | OUTPATIENT
Start: 2020-04-30 | End: 2020-07-06 | Stop reason: SDUPTHER

## 2020-04-30 RX ORDER — OLMESARTAN MEDOXOMIL 40 MG/1
TABLET ORAL
Qty: 90 TABLET | Refills: 3 | Status: SHIPPED | OUTPATIENT
Start: 2020-04-30 | End: 2020-07-06 | Stop reason: SDUPTHER

## 2020-06-25 ENCOUNTER — TELEPHONE (OUTPATIENT)
Dept: CARDIOLOGY | Facility: CLINIC | Age: 80
End: 2020-06-25

## 2020-06-25 NOTE — TELEPHONE ENCOUNTER
Last office note sent.       ----- Message from Yesica Escobar, Patient Care Assistant sent at 6/25/2020  9:56 AM CDT -----  Name of Who is Calling: Nadya with [facility]    What is the request in detail: Requesting for last office notes to be faxed. Please contact to further discuss and advise      Can the clinic reply by [facility]: No    What Number to Call Back if not in [facility]:   Fax [phone]   [phone]

## 2020-07-06 ENCOUNTER — OFFICE VISIT (OUTPATIENT)
Dept: CARDIOLOGY | Facility: CLINIC | Age: 80
End: 2020-07-06
Attending: INTERNAL MEDICINE
Payer: MEDICARE

## 2020-07-06 VITALS
DIASTOLIC BLOOD PRESSURE: 60 MMHG | HEART RATE: 52 BPM | WEIGHT: 118.63 LBS | BODY MASS INDEX: 23.96 KG/M2 | SYSTOLIC BLOOD PRESSURE: 126 MMHG

## 2020-07-06 DIAGNOSIS — Z87.898 HISTORY OF CHEST PAIN: ICD-10-CM

## 2020-07-06 DIAGNOSIS — I65.23 BILATERAL CAROTID ARTERY STENOSIS: ICD-10-CM

## 2020-07-06 DIAGNOSIS — E78.00 HYPERCHOLESTEROLEMIA: ICD-10-CM

## 2020-07-06 DIAGNOSIS — I77.1 SUBCLAVIAN ARTERY STENOSIS: ICD-10-CM

## 2020-07-06 DIAGNOSIS — Z98.890 HISTORY OF NECK SURGERY: ICD-10-CM

## 2020-07-06 DIAGNOSIS — I10 ESSENTIAL HYPERTENSION: ICD-10-CM

## 2020-07-06 DIAGNOSIS — I50.32 HEART FAILURE, DIASTOLIC, CHRONIC: ICD-10-CM

## 2020-07-06 PROCEDURE — 93005 ELECTROCARDIOGRAM TRACING: CPT

## 2020-07-06 PROCEDURE — 1159F PR MEDICATION LIST DOCUMENTED IN MEDICAL RECORD: ICD-10-PCS | Mod: S$GLB,,, | Performed by: INTERNAL MEDICINE

## 2020-07-06 PROCEDURE — 99214 OFFICE O/P EST MOD 30 MIN: CPT | Mod: 25,S$GLB,, | Performed by: INTERNAL MEDICINE

## 2020-07-06 PROCEDURE — 1159F MED LIST DOCD IN RCRD: CPT | Mod: S$GLB,,, | Performed by: INTERNAL MEDICINE

## 2020-07-06 PROCEDURE — 99214 PR OFFICE/OUTPT VISIT, EST, LEVL IV, 30-39 MIN: ICD-10-PCS | Mod: 25,S$GLB,, | Performed by: INTERNAL MEDICINE

## 2020-07-06 PROCEDURE — 3078F DIAST BP <80 MM HG: CPT | Mod: CPTII,S$GLB,, | Performed by: INTERNAL MEDICINE

## 2020-07-06 PROCEDURE — 93000 PR ELECTROCARDIOGRAM, COMPLETE: ICD-10-PCS | Mod: S$GLB,,, | Performed by: INTERNAL MEDICINE

## 2020-07-06 PROCEDURE — 3074F PR MOST RECENT SYSTOLIC BLOOD PRESSURE < 130 MM HG: ICD-10-PCS | Mod: CPTII,S$GLB,, | Performed by: INTERNAL MEDICINE

## 2020-07-06 PROCEDURE — 3074F SYST BP LT 130 MM HG: CPT | Mod: CPTII,S$GLB,, | Performed by: INTERNAL MEDICINE

## 2020-07-06 PROCEDURE — 3078F PR MOST RECENT DIASTOLIC BLOOD PRESSURE < 80 MM HG: ICD-10-PCS | Mod: CPTII,S$GLB,, | Performed by: INTERNAL MEDICINE

## 2020-07-06 PROCEDURE — 99999 PR PBB SHADOW E&M-EST. PATIENT-LVL III: CPT | Mod: PBBFAC,,, | Performed by: INTERNAL MEDICINE

## 2020-07-06 PROCEDURE — 93010 ELECTROCARDIOGRAM REPORT: CPT | Mod: S$GLB,,, | Performed by: INTERNAL MEDICINE

## 2020-07-06 PROCEDURE — 93000 ELECTROCARDIOGRAM COMPLETE: CPT | Mod: S$GLB,,, | Performed by: INTERNAL MEDICINE

## 2020-07-06 PROCEDURE — 93010 EKG 12-LEAD: ICD-10-PCS | Mod: S$GLB,,, | Performed by: INTERNAL MEDICINE

## 2020-07-06 PROCEDURE — 99999 PR PBB SHADOW E&M-EST. PATIENT-LVL III: ICD-10-PCS | Mod: PBBFAC,,, | Performed by: INTERNAL MEDICINE

## 2020-07-06 RX ORDER — ASPIRIN 81 MG/1
81 TABLET ORAL DAILY
Qty: 90 TABLET | Refills: 3 | Status: SHIPPED | OUTPATIENT
Start: 2020-07-06 | End: 2021-01-06 | Stop reason: SDUPTHER

## 2020-07-06 RX ORDER — ATORVASTATIN CALCIUM 40 MG/1
40 TABLET, FILM COATED ORAL DAILY
Qty: 90 TABLET | Refills: 3 | Status: SHIPPED | OUTPATIENT
Start: 2020-07-06 | End: 2021-01-06 | Stop reason: SDUPTHER

## 2020-07-06 RX ORDER — ESCITALOPRAM OXALATE 10 MG/1
10 TABLET ORAL DAILY
COMMUNITY

## 2020-07-06 RX ORDER — FUROSEMIDE 20 MG/1
20 TABLET ORAL DAILY
Qty: 90 TABLET | Refills: 3 | Status: SHIPPED | OUTPATIENT
Start: 2020-07-06 | End: 2021-01-06 | Stop reason: SDUPTHER

## 2020-07-06 RX ORDER — METOPROLOL SUCCINATE 25 MG/1
25 TABLET, EXTENDED RELEASE ORAL DAILY
Qty: 90 TABLET | Refills: 3 | Status: SHIPPED | OUTPATIENT
Start: 2020-07-06 | End: 2021-01-06 | Stop reason: SDUPTHER

## 2020-07-06 RX ORDER — OLMESARTAN MEDOXOMIL 40 MG/1
40 TABLET ORAL DAILY
Qty: 90 TABLET | Refills: 3 | Status: SHIPPED | OUTPATIENT
Start: 2020-07-06 | End: 2021-01-06 | Stop reason: SDUPTHER

## 2020-07-06 RX ORDER — AMLODIPINE BESYLATE 10 MG/1
10 TABLET ORAL DAILY
Qty: 90 TABLET | Refills: 3 | Status: SHIPPED | OUTPATIENT
Start: 2020-07-06 | End: 2021-01-06 | Stop reason: SDUPTHER

## 2020-07-17 ENCOUNTER — HOSPITAL ENCOUNTER (OUTPATIENT)
Dept: CARDIOLOGY | Facility: OTHER | Age: 80
Discharge: HOME | End: 2020-07-17
Attending: INTERNAL MEDICINE
Payer: MEDICARE

## 2020-07-17 DIAGNOSIS — I77.1 SUBCLAVIAN ARTERY STENOSIS: ICD-10-CM

## 2020-07-17 DIAGNOSIS — I65.23 BILATERAL CAROTID ARTERY STENOSIS: ICD-10-CM

## 2020-07-17 LAB
LEFT CBA DIAS: 14 CM/S
LEFT CBA SYS: 67 CM/S
LEFT CCA DIST DIAS: 13 CM/S
LEFT CCA DIST SYS: 53 CM/S
LEFT CCA MID DIAS: 12 CM/S
LEFT CCA MID SYS: 57 CM/S
LEFT CCA PROX DIAS: 15 CM/S
LEFT CCA PROX SYS: 65 CM/S
LEFT ECA DIAS: 7 CM/S
LEFT ECA SYS: 69 CM/S
LEFT ICA DIST DIAS: 37 CM/S
LEFT ICA DIST SYS: 128 CM/S
LEFT ICA MID DIAS: 46 CM/S
LEFT ICA MID SYS: 147 CM/S
LEFT ICA PROX DIAS: 37 CM/S
LEFT ICA PROX SYS: 134 CM/S
LEFT VERTEBRAL DIAS: 11 CM/S
LEFT VERTEBRAL SYS: 59 CM/S
OHS CV CAROTID RIGHT ICA EDV HIGHEST: 28
OHS CV CAROTID ULTRASOUND LEFT ICA/CCA RATIO: 2.26
OHS CV CAROTID ULTRASOUND RIGHT ICA/CCA RATIO: 1.68
OHS CV PV CAROTID LEFT HIGHEST CCA: 65
OHS CV PV CAROTID LEFT HIGHEST ICA: 147
OHS CV PV CAROTID RIGHT HIGHEST CCA: 62
OHS CV PV CAROTID RIGHT HIGHEST ICA: 104
OHS CV US CAROTID LEFT HIGHEST EDV: 46
RIGHT CBA DIAS: 7 CM/S
RIGHT CBA SYS: 32 CM/S
RIGHT CCA DIST DIAS: 9 CM/S
RIGHT CCA DIST SYS: 42 CM/S
RIGHT CCA MID DIAS: 12 CM/S
RIGHT CCA MID SYS: 62 CM/S
RIGHT CCA PROX DIAS: 13 CM/S
RIGHT CCA PROX SYS: 61 CM/S
RIGHT ECA DIAS: 9 CM/S
RIGHT ECA SYS: 82 CM/S
RIGHT ICA DIST DIAS: 28 CM/S
RIGHT ICA DIST SYS: 104 CM/S
RIGHT ICA MID DIAS: 25 CM/S
RIGHT ICA MID SYS: 95 CM/S
RIGHT ICA PROX DIAS: 19 CM/S
RIGHT ICA PROX SYS: 74 CM/S
RIGHT VERTEBRAL DIAS: 6 CM/S
RIGHT VERTEBRAL SYS: 35 CM/S

## 2020-07-17 PROCEDURE — 93880 EXTRACRANIAL BILAT STUDY: CPT

## 2020-07-17 PROCEDURE — 93880 CV US DOPPLER CAROTID (CUPID ONLY): ICD-10-PCS | Mod: 26,,, | Performed by: INTERNAL MEDICINE

## 2020-07-17 PROCEDURE — 93880 EXTRACRANIAL BILAT STUDY: CPT | Mod: 26,,, | Performed by: INTERNAL MEDICINE

## 2020-08-18 ENCOUNTER — HOSPITAL ENCOUNTER (EMERGENCY)
Facility: HOSPITAL | Age: 80
Discharge: HOME | End: 2020-08-18
Attending: INTERNAL MEDICINE
Payer: MEDICARE

## 2020-08-18 VITALS
RESPIRATION RATE: 18 BRPM | SYSTOLIC BLOOD PRESSURE: 177 MMHG | BODY MASS INDEX: 24.24 KG/M2 | HEART RATE: 55 BPM | OXYGEN SATURATION: 98 % | TEMPERATURE: 98 F | WEIGHT: 120 LBS | DIASTOLIC BLOOD PRESSURE: 79 MMHG

## 2020-08-18 DIAGNOSIS — S22.31XA CLOSED FRACTURE OF ONE RIB OF RIGHT SIDE, INITIAL ENCOUNTER: Primary | ICD-10-CM

## 2020-08-18 DIAGNOSIS — R07.81 RIB PAIN ON RIGHT SIDE: ICD-10-CM

## 2020-08-18 PROCEDURE — 63600175 PHARM REV CODE 636 W HCPCS: Mod: ER | Performed by: INTERNAL MEDICINE

## 2020-08-18 PROCEDURE — 96372 THER/PROPH/DIAG INJ SC/IM: CPT | Mod: ER

## 2020-08-18 PROCEDURE — 99284 EMERGENCY DEPT VISIT MOD MDM: CPT | Mod: 25,ER

## 2020-08-18 RX ORDER — MELOXICAM 15 MG/1
15 TABLET ORAL DAILY
Qty: 30 TABLET | Refills: 0 | Status: SHIPPED | OUTPATIENT
Start: 2020-08-18

## 2020-08-18 RX ORDER — KETOROLAC TROMETHAMINE 30 MG/ML
30 INJECTION, SOLUTION INTRAMUSCULAR; INTRAVENOUS
Status: COMPLETED | OUTPATIENT
Start: 2020-08-18 | End: 2020-08-18

## 2020-08-18 RX ADMIN — KETOROLAC TROMETHAMINE 30 MG: 30 INJECTION, SOLUTION INTRAMUSCULAR at 10:08

## 2020-08-18 NOTE — ED PROVIDER NOTES
"Encounter Date: 2020    SCRIBE #1 NOTE: I, Arianna Carrillo , am scribing for, and in the presence of,  Dr. Nino . I have scribed the following portions of the note - Other sections scribed: HPI, ROS, PE.       History     Chief Complaint   Patient presents with    Fall     Pt states,' I had a fall four days ago and am having pains in my right side. I am also short of breath."    Loss of Consciousness     Ting Ward is a 79 y.o. female with HTN who presents to the ED complaining of right rib pain s/p a fall that occurred 4 days ago. She states going to the restroom in the middle of the night when she missed the toilet and fell hitting the side of the toilet. No head injury or LOC. Patient is also complaining of mild SOB since the fall. Denies any other complaints.     The history is provided by the patient. No  was used.     Review of patient's allergies indicates:   Allergen Reactions    Codeine Hallucinations     Past Medical History:   Diagnosis Date    Carotid artery stenosis 2015    10/26/2015: Carotid Duplex: BRITT: moderate plaquing - <50%. BRITT: severe plaquing - 50-60%. Right vertebral: retrograde flow.    Chest pain 2015: Episode of chest tightness.    Heart failure, diastolic, acute on chronic 2018: CXR: Small pleural effusions.    Hypercholesterolemia 2015: Began statin.    Hypertension, benign 2015: Diagnosed.    Pre-operative cardiovascular examination 2016: Plan is cervical neck surgery.     Past Surgical History:   Procedure Laterality Date    SUBTOTAL COLECTOMY N/A     Polyp surgically removed     History reviewed. No pertinent family history.  Social History     Tobacco Use    Smoking status: Former Smoker     Packs/day: 0.10     Years: 34.00     Pack years: 3.40     Start date: 1982     Quit date: 2015     Years since quittin.8    Smokeless tobacco: Never " Used   Substance Use Topics    Alcohol use: No     Alcohol/week: 0.0 standard drinks    Drug use: Not on file     Review of Systems   Constitutional: Negative for fever.   HENT: Negative for sore throat.    Respiratory: Positive for shortness of breath.    Cardiovascular: Negative for chest pain.   Gastrointestinal: Negative for nausea and vomiting.   Genitourinary: Negative for dysuria.   Musculoskeletal: Positive for arthralgias. Negative for back pain.   Skin: Positive for color change. Negative for rash.   Neurological: Negative for syncope, weakness and headaches.   Hematological: Negative for adenopathy.   Psychiatric/Behavioral: Negative for behavioral problems.   All other systems reviewed and are negative.      Physical Exam     Initial Vitals [08/18/20 0943]   BP Pulse Resp Temp SpO2   (!) 192/79 77 16 98 °F (36.7 °C) 95 %      MAP       --         Physical Exam    Nursing note and vitals reviewed.  Constitutional: She appears well-developed and well-nourished.   HENT:   Head: Normocephalic and atraumatic.   Right Ear: External ear normal.   Left Ear: External ear normal.   Eyes: Conjunctivae are normal.   Neck: Normal range of motion. Neck supple.   Cardiovascular: Normal rate, regular rhythm and normal heart sounds. Exam reveals no gallop and no friction rub.    No murmur heard.  Pulmonary/Chest: Effort normal and breath sounds normal. No respiratory distress. She has no wheezes. She has no rhonchi. She has no rales. She exhibits tenderness. She exhibits no mass, no bony tenderness, no laceration, no crepitus, no edema, no deformity, no swelling and no retraction.   Right posterior lateral ecchymosis to the rib region with TTP. No gross deformity    Abdominal: Soft. There is no abdominal tenderness.   Musculoskeletal: Normal range of motion. No edema.   Neurological: She is alert and oriented to person, place, and time. She has normal strength and normal reflexes. No cranial nerve deficit or sensory  deficit. GCS score is 15. GCS eye subscore is 4. GCS verbal subscore is 5. GCS motor subscore is 6.   Skin: Skin is warm and dry.   Psychiatric: She has a normal mood and affect.         ED Course   Procedures  Labs Reviewed - No data to display       Imaging Results          X-Ray Ribs 2 View Right (Final result)  Result time 08/18/20 11:04:25    Final result by Peyman Moreau MD (08/18/20 11:04:25)                 Impression:      Fracture of the lateral aspect of the right 5th rib.      Electronically signed by: Peyman Moreau  Date:    08/18/2020  Time:    11:04             Narrative:    EXAMINATION:  XR RIBS 2 VIEW RIGHT    CLINICAL HISTORY:  Pleurodynia    TECHNIQUE:  Two views of the right ribs were performed.    COMPARISON:  April 20, 2018    FINDINGS:  There is a fracture of the lateral aspect of the 5th rib.  This is projecting over the right scapula.  There does not appear to be evidence of any additional rib fracture injuries.  The lung parenchyma is intact without evidence of a contusion or pneumothorax.                                 Medical Decision Making:   Initial Assessment:   Ting Ward is a 79 y.o. female with HTN who presents to the ED complaining of right rib pain s/p a fall that occurred 4 days ago. She states going to the restroom in the middle of the night when she missed the toilet and fell hitting the side of the toilet. No head injury or LOC. Patient is also complaining of mild SOB since the fall. Denies any other complaints.   Clinical Tests:   Radiological Study: Ordered and Reviewed  ED Management:  X-ray of right ribs reveals right 5th rib fracture.  Patient was given instructions for rib fracture and received Toradol in the emergency department as well as prescription for Mobic.  She was advised to follow up with her primary care physician within the next week for re-evaluation/return to the emergency department if condition worsens.            Scribe Attestation:   Scribe  #1: I performed the above scribed service and the documentation accurately describes the services I performed. I attest to the accuracy of the note.    This document was produced by a scribe under my direction and in my presence. I agree with the content of the note and have made any necessary edits.     Dr. Nino    08/18/2020 4:32 PM                        Clinical Impression:     1. Closed fracture of one rib of right side, initial encounter    2. Rib pain on right side                ED Disposition Condition    Discharge Stable        ED Prescriptions     Medication Sig Dispense Start Date End Date Auth. Provider    meloxicam (MOBIC) 15 MG tablet Take 1 tablet (15 mg total) by mouth once daily. 30 tablet 8/18/2020  Dieudonne Nino MD        Follow-up Information    None                                    Dieudonne Nino MD  08/18/20 6608

## 2020-08-18 NOTE — ED NOTES
Pt reports took two tylenol pm's 4 nights ago and went to restroom in middle of the night and missed toilet seat falling to side of toilet and being incontinent of urine with fall, unknown if she passed out. [place] snow has pain to right ribs with mild SOB. Bruising noted to right side ribs/back. Resp even, unlabored. 99% on room air. Family at bedside and call light within reach.

## 2020-11-19 ENCOUNTER — HOSPITAL ENCOUNTER (EMERGENCY)
Facility: HOSPITAL | Age: 80
Discharge: HOME | End: 2020-11-19
Attending: EMERGENCY MEDICINE
Payer: MEDICARE

## 2020-11-19 VITALS
RESPIRATION RATE: 20 BRPM | WEIGHT: 117 LBS | OXYGEN SATURATION: 99 % | BODY MASS INDEX: 27.08 KG/M2 | DIASTOLIC BLOOD PRESSURE: 96 MMHG | SYSTOLIC BLOOD PRESSURE: 192 MMHG | HEART RATE: 90 BPM | TEMPERATURE: 98 F | HEIGHT: 55 IN

## 2020-11-19 DIAGNOSIS — S61.521A: ICD-10-CM

## 2020-11-19 DIAGNOSIS — S00.03XA CONTUSION OF SCALP, INITIAL ENCOUNTER: Primary | ICD-10-CM

## 2020-11-19 DIAGNOSIS — M25.551 RIGHT HIP PAIN: ICD-10-CM

## 2020-11-19 DIAGNOSIS — S09.90XA INJURY OF HEAD, INITIAL ENCOUNTER: ICD-10-CM

## 2020-11-19 DIAGNOSIS — M25.511 RIGHT SHOULDER PAIN: ICD-10-CM

## 2020-11-19 PROCEDURE — 99284 EMERGENCY DEPT VISIT MOD MDM: CPT | Mod: 25,ER

## 2020-11-19 PROCEDURE — 25000003 PHARM REV CODE 250: Mod: ER | Performed by: EMERGENCY MEDICINE

## 2020-11-19 RX ORDER — ACETAMINOPHEN 500 MG
1000 TABLET ORAL
Status: COMPLETED | OUTPATIENT
Start: 2020-11-19 | End: 2020-11-19

## 2020-11-19 RX ORDER — METHOCARBAMOL 500 MG/1
500 TABLET, FILM COATED ORAL EVERY 6 HOURS
Qty: 12 TABLET | Refills: 0 | Status: SHIPPED | OUTPATIENT
Start: 2020-11-19 | End: 2020-11-22

## 2020-11-19 RX ORDER — BACITRACIN 500 [USP'U]/G
OINTMENT TOPICAL 2 TIMES DAILY
Refills: 0 | COMMUNITY
Start: 2020-11-19

## 2020-11-19 RX ADMIN — ACETAMINOPHEN 1000 MG: 500 TABLET ORAL at 08:11

## 2020-11-19 RX ADMIN — BACITRACIN, NEOMYCIN, POLYMYXIN B 1 EACH: 400; 3.5; 5 OINTMENT TOPICAL at 10:11

## 2020-11-20 ENCOUNTER — PES CALL (OUTPATIENT)
Dept: ADMINISTRATIVE | Facility: CLINIC | Age: 80
End: 2020-11-20

## 2020-11-20 NOTE — ED PROVIDER NOTES
"Encounter Date: 11/19/2020    SCRIBE #1 NOTE: I, Rosalie Robbins , am scribing for, and in the presence of,  Dr. Cam . I have scribed the following portions of the note - Other sections scribed: HPI, ROS, PE .       History     Chief Complaint   Patient presents with    Fall     complains of fall while walking up steps. reports fell backwards and landed on right side. pain to right hip and also reports striking right side of head. denies LOC but reports "I was stunned.."     80 y.o. female with multiple medical problems including heart failure, carotid stenosis, hypertension and others presents to the  emergency department complaining of acute pain and swelling to the back of the head, right shoulder pain and right hip pain that began about 20 min prior to arrival after she slipped and fell backwards while attempting to climb up the 2nd step at her neighbor sounds.   She reports hitting the back of her head on the ground but denies loss of consciousness, nausea, vomiting, back pain or gait disturbance.  She reports being able to stand and ambulate unassisted following the incident.  Denies neck pain but reports previous neck fusion and is concerned about possible injury to the neck.  Denies anticoagulant use. Noticed bruising to right wrist and palm of left hand but denies pain or weakness to these areas.    Denies frequent falls but reports a fall 3 months ago with rib fracture. She does not use a walker or a cane at baseline.        The history is provided by the patient. No  was used.     Review of patient's allergies indicates:   Allergen Reactions    Codeine Hallucinations     Past Medical History:   Diagnosis Date    Carotid artery stenosis 12/21/2015    10/26/2015: Carotid Duplex: BRITT: moderate plaquing - <50%. BRITT: severe plaquing - 50-60%. Right vertebral: retrograde flow.    Chest pain 12/21/2015 12/17/2015: Episode of chest tightness.    Heart failure, diastolic, acute on chronic " 2018: CXR: Small pleural effusions.    Hypercholesterolemia 2015: Began statin.    Hypertension, benign 2015: Diagnosed.    Pre-operative cardiovascular examination 2016: Plan is cervical neck surgery.     Past Surgical History:   Procedure Laterality Date    SUBTOTAL COLECTOMY N/A     Polyp surgically removed     No family history on file.  Social History     Tobacco Use    Smoking status: Former Smoker     Packs/day: 0.10     Years: 34.00     Pack years: 3.40     Start date: 1982     Quit date: 2015     Years since quittin.0    Smokeless tobacco: Never Used   Substance Use Topics    Alcohol use: No     Alcohol/week: 0.0 standard drinks    Drug use: Not on file     Review of Systems   Eyes: Negative for visual disturbance.   Gastrointestinal: Negative for nausea and vomiting.   Musculoskeletal: Positive for arthralgias. Negative for back pain, gait problem, joint swelling and neck pain.   Skin: Positive for color change and wound.   Neurological: Positive for headaches. Negative for dizziness, syncope, weakness and numbness.   Psychiatric/Behavioral: Negative for confusion.   All other systems reviewed and are negative.      Physical Exam     Initial Vitals [20]   BP Pulse Resp Temp SpO2   (!) 211/90 97 14 97.9 °F (36.6 °C) 100 %      MAP       --         Physical Exam    Nursing note and vitals reviewed.  Constitutional: She appears well-developed and well-nourished.   HENT:   Head: Normocephalic and atraumatic.   Right Ear: No hemotympanum.   Left Ear: No hemotympanum.   Nose: Nose normal.   Mouth/Throat: Oropharynx is clear and moist.   Hematoma on the right occipital scalp. Skin intact. No skull depression.    Eyes: Conjunctivae are normal.   Neck: Normal range of motion and phonation normal. Neck supple. No stridor present.   Cardiovascular: Normal rate and intact distal pulses.   Pulses:       Radial pulses are  2+ on the right side and 2+ on the left side.   Pulmonary/Chest: Effort normal. No stridor. No respiratory distress.   Musculoskeletal: No tenderness or edema.      Right wrist: She exhibits normal range of motion and no bony tenderness.      Right hip: She exhibits normal range of motion.      Comments: Right hip: No ecchymosis. Skin intact.   The posterior right upper back has mild erythema. Skin intact.    Neurological: She is alert and oriented to person, place, and time. She has normal strength. Gait normal. GCS score is 15. GCS eye subscore is 4. GCS verbal subscore is 5. GCS motor subscore is 6.   Skin: Skin is warm and dry. Capillary refill takes less than 2 seconds. Bruising, ecchymosis and laceration (superficial right wrist) noted. No rash noted.   Subcentimeter superficial curvilinear laceration to the right anterio-lateral wrist.    Psychiatric: She has a normal mood and affect. Her behavior is normal.         ED Course   Procedures  Labs Reviewed - No data to display       Imaging Results          CT Hip Without Contrast Right (Final result)  Result time 11/19/20 21:49:53    Final result by Magda Perez MD (11/19/20 21:49:53)                 Impression:      No fracture seen.      Electronically signed by: Magda Perez MD  Date:    11/19/2020  Time:    21:49             Narrative:    EXAMINATION:  CT HIP WITHOUT CONTRAST RIGHT    CLINICAL HISTORY:  Hip trauma, nondiagnostic xray;    TECHNIQUE:  CT of the right hip was obtained without the use of IV contrast.    COMPARISON:  Right hip radiographs from the same date.    FINDINGS:  No evidence of acute fracture or dislocation.  Degenerative changes are seen at the pubic symphysis.  Surrounding soft tissues show no significant abnormalities.  Partially visualized lower abdominal and intrapelvic contents show no significant abnormalities.                               X-Ray Hip 2 View Right (Final result)  Result time 11/19/20 21:02:42    Final  result by Magda Perez MD (11/19/20 21:02:42)                 Impression:      As above.      Electronically signed by: Magda Perez MD  Date:    11/19/2020  Time:    21:02             Narrative:    EXAMINATION:  XR HIP 2 VIEW RIGHT    CLINICAL HISTORY:  Pain in right hip    TECHNIQUE:  AP view of the pelvis and frog leg lateral view of the right hip were performed.    COMPARISON:  None    FINDINGS:  No definite acute displaced fracture or dislocation seen.  There is mild linear lucency visualized over the medial aspect of the superior pubic ramus.  This may relate to superimposed lucency, however difficult to completely exclude nondisplaced fracture in the setting of trauma.  If clinical concern persists for fracture, CT of the pelvis may be obtained further evaluation.  Mild right hip joint space narrowing is seen.                               X-Ray Shoulder Trauma Right (Final result)  Result time 11/19/20 20:59:05    Final result by Carlos Wheatley MD (11/19/20 20:59:05)                 Impression:      No acute displaced fracture-dislocation identified.      Electronically signed by: Carlos Wheatley MD  Date:    11/19/2020  Time:    20:59             Narrative:    EXAMINATION:  XR SHOULDER TRAUMA 3 VIEW RIGHT    CLINICAL HISTORY:  Pain in right shoulder    TECHNIQUE:  Three or four views of the right shoulder were performed.    COMPARISON:  Right rib series 08/18/2020 and chest radiograph 04/20/2018    FINDINGS:  Generalized osteopenia.  Overall alignment is within normal limits.  No displaced fracture, dislocation or destructive osseous process.  Mild degenerative change noted about the shoulder.  Partially imaged lower cervical hardware noted.  No subcutaneous emphysema or radiodense retained foreign body.  No right apical pneumothorax noting biapical pleuroparenchymal scarring.                               CT Head Without Contrast (Final result)  Result time 11/19/20 20:50:38    Final result by Carlos  CAMERON Wheatley MD (11/19/20 20:50:38)                 Impression:      Extensive scalp soft tissue swelling with superimposed subcutaneous contusion/hematoma overlying the right frontal parietal and temporal calvarium without displaced skull fracture or acute intracranial abnormality identified.    Senescent and chronic microvascular ischemic change.      Electronically signed by: Carlos Wheatley MD  Date:    11/19/2020  Time:    20:50             Narrative:    EXAMINATION:  CT HEAD WITHOUT CONTRAST    CLINICAL HISTORY:  Head trauma, minor (Age => 64y);    TECHNIQUE:  Low dose axial CT images obtained throughout the head without intravenous contrast. Sagittal and coronal reconstructions were performed.    COMPARISON:  MRI brain 06/23/2008    FINDINGS:  Patient is slightly rotated and tilted within the scanner.    Intracranial compartment: Brain appears normally formed.  Age appropriate mild generalized cerebral volume loss.    Ventricles are midline without distortion by mass effect or acute hydrocephalus.  No extra-axial blood or fluid collections.    Patchy hypoattenuation of the subcortical and periventricular white matter, likely sequela of chronic microvascular ischemic change, overall somewhat progressed from MRI brain of 06/23/2008.  Skull base atherosclerotic vascular calcifications noted.  No parenchymal mass, hemorrhage, edema or major vascular distribution infarct.    Skull/extracranial contents (limited evaluation): Extensive soft tissue swelling with superimposed subcutaneous ill-defined hypoattenuation consistent with contusion/hematoma overlying the right frontal parietal and to a lesser extent temporal calvarium.  No subcutaneous emphysema or radiodense retained foreign body.  No fracture. Mastoid air cells and paranasal sinuses are essentially clear.                               CT Cervical Spine Without Contrast (Final result)  Result time 11/19/20 20:59:29    Final result by Magda Perez MD  (11/19/20 20:59:29)                 Impression:      No evidence of acute cervical spine fracture or dislocation.      Electronically signed by: Magda Perez MD  Date:    11/19/2020  Time:    20:59             Narrative:    EXAMINATION:  CT CERVICAL SPINE WITHOUT CONTRAST    CLINICAL HISTORY:  C-spine fusion, follow up;    TECHNIQUE:  Low dose axial images, sagittal and coronal reformations were performed though the cervical spine.  Contrast was not administered.    COMPARISON:  None    FINDINGS:  No evidence of acute cervical spine fracture or dislocation.  Odontoid process is intact.  Craniocervical junction is unremarkable.  Cervical spine alignment is within normal limits.  Postsurgical fusion changes and laminectomies are visualized at the C3 through C6 levels.    Surrounding soft tissues show no significant abnormalities.  Airway is patent.  Partially visualized lung apices are clear.                                 Medical Decision Making:   History:   Old Medical Records: I decided to obtain old medical records.  Clinical Tests:   Radiological Study: Ordered and Reviewed    Labs Reviewed       Imaging Reviewed    Imaging Results          CT Hip Without Contrast Right (Final result)  Result time 11/19/20 21:49:53    Final result by Magda Perez MD (11/19/20 21:49:53)                 Impression:      No fracture seen.      Electronically signed by: Magda Perez MD  Date:    11/19/2020  Time:    21:49             Narrative:    EXAMINATION:  CT HIP WITHOUT CONTRAST RIGHT    CLINICAL HISTORY:  Hip trauma, nondiagnostic xray;    TECHNIQUE:  CT of the right hip was obtained without the use of IV contrast.    COMPARISON:  Right hip radiographs from the same date.    FINDINGS:  No evidence of acute fracture or dislocation.  Degenerative changes are seen at the pubic symphysis.  Surrounding soft tissues show no significant abnormalities.  Partially visualized lower abdominal and intrapelvic contents show no  significant abnormalities.                               X-Ray Hip 2 View Right (Final result)  Result time 11/19/20 21:02:42    Final result by Magda Perez MD (11/19/20 21:02:42)                 Impression:      As above.      Electronically signed by: Magda Perez MD  Date:    11/19/2020  Time:    21:02             Narrative:    EXAMINATION:  XR HIP 2 VIEW RIGHT    CLINICAL HISTORY:  Pain in right hip    TECHNIQUE:  AP view of the pelvis and frog leg lateral view of the right hip were performed.    COMPARISON:  None    FINDINGS:  No definite acute displaced fracture or dislocation seen.  There is mild linear lucency visualized over the medial aspect of the superior pubic ramus.  This may relate to superimposed lucency, however difficult to completely exclude nondisplaced fracture in the setting of trauma.  If clinical concern persists for fracture, CT of the pelvis may be obtained further evaluation.  Mild right hip joint space narrowing is seen.                               X-Ray Shoulder Trauma Right (Final result)  Result time 11/19/20 20:59:05    Final result by Carlos Wheatley MD (11/19/20 20:59:05)                 Impression:      No acute displaced fracture-dislocation identified.      Electronically signed by: Carlos Wheatley MD  Date:    11/19/2020  Time:    20:59             Narrative:    EXAMINATION:  XR SHOULDER TRAUMA 3 VIEW RIGHT    CLINICAL HISTORY:  Pain in right shoulder    TECHNIQUE:  Three or four views of the right shoulder were performed.    COMPARISON:  Right rib series 08/18/2020 and chest radiograph 04/20/2018    FINDINGS:  Generalized osteopenia.  Overall alignment is within normal limits.  No displaced fracture, dislocation or destructive osseous process.  Mild degenerative change noted about the shoulder.  Partially imaged lower cervical hardware noted.  No subcutaneous emphysema or radiodense retained foreign body.  No right apical pneumothorax noting biapical pleuroparenchymal  scarring.                               CT Head Without Contrast (Final result)  Result time 11/19/20 20:50:38    Final result by Carlos Wheatley MD (11/19/20 20:50:38)                 Impression:      Extensive scalp soft tissue swelling with superimposed subcutaneous contusion/hematoma overlying the right frontal parietal and temporal calvarium without displaced skull fracture or acute intracranial abnormality identified.    Senescent and chronic microvascular ischemic change.      Electronically signed by: Carlos Wheatley MD  Date:    11/19/2020  Time:    20:50             Narrative:    EXAMINATION:  CT HEAD WITHOUT CONTRAST    CLINICAL HISTORY:  Head trauma, minor (Age => 64y);    TECHNIQUE:  Low dose axial CT images obtained throughout the head without intravenous contrast. Sagittal and coronal reconstructions were performed.    COMPARISON:  MRI brain 06/23/2008    FINDINGS:  Patient is slightly rotated and tilted within the scanner.    Intracranial compartment: Brain appears normally formed.  Age appropriate mild generalized cerebral volume loss.    Ventricles are midline without distortion by mass effect or acute hydrocephalus.  No extra-axial blood or fluid collections.    Patchy hypoattenuation of the subcortical and periventricular white matter, likely sequela of chronic microvascular ischemic change, overall somewhat progressed from MRI brain of 06/23/2008.  Skull base atherosclerotic vascular calcifications noted.  No parenchymal mass, hemorrhage, edema or major vascular distribution infarct.    Skull/extracranial contents (limited evaluation): Extensive soft tissue swelling with superimposed subcutaneous ill-defined hypoattenuation consistent with contusion/hematoma overlying the right frontal parietal and to a lesser extent temporal calvarium.  No subcutaneous emphysema or radiodense retained foreign body.  No fracture. Mastoid air cells and paranasal sinuses are essentially clear.                                CT Cervical Spine Without Contrast (Final result)  Result time 11/19/20 20:59:29    Final result by Magda Perez MD (11/19/20 20:59:29)                 Impression:      No evidence of acute cervical spine fracture or dislocation.      Electronically signed by: Magda Perez MD  Date:    11/19/2020  Time:    20:59             Narrative:    EXAMINATION:  CT CERVICAL SPINE WITHOUT CONTRAST    CLINICAL HISTORY:  C-spine fusion, follow up;    TECHNIQUE:  Low dose axial images, sagittal and coronal reformations were performed though the cervical spine.  Contrast was not administered.    COMPARISON:  None    FINDINGS:  No evidence of acute cervical spine fracture or dislocation.  Odontoid process is intact.  Craniocervical junction is unremarkable.  Cervical spine alignment is within normal limits.  Postsurgical fusion changes and laminectomies are visualized at the C3 through C6 levels.    Surrounding soft tissues show no significant abnormalities.  Airway is patent.  Partially visualized lung apices are clear.                                Medications given in ED    Medications   acetaminophen tablet 1,000 mg (1,000 mg Oral Given 11/19/20 2024)   neomycin-bacitracnZn-polymyxnB packet 1 each (1 each Topical (Top) Given 11/19/20 2252)       Scribe Attestation:   Scribe #1: I performed the above scribed service and the documentation accurately describes the services I performed. I attest to the accuracy of the note.    This document was produced by a scribe under my direction and in my presence. I agree with the content of the note and have made any necessary edits.     Rahul Cam MD         Note was created using voice recognition software. Note may have occasional typographical errors that may not have been identified and edited despite good kemar initial review prior to signing.            Scribe Attestation:   Scribe #1: I performed the above scribed service and the documentation accurately describes  the services I performed. I attest to the accuracy of the note.    Attending Attestation:           Physician Attestation for Scribe:  Physician Attestation Statement for Scribe #1: I, Rahul Cam, reviewed documentation, as scribed by Rosalie Robbins in my presence, and it is both accurate and complete.                           Clinical Impression:     ICD-10-CM ICD-9-CM   1. Contusion of scalp, initial encounter  S00.03XA 920   2. Right hip pain  M25.551 719.45   3. Right shoulder pain  M25.511 719.41   4. Superficial laceration of right wrist with foreign body, initial encounter  S61.521A 881.12   5. Injury of head, initial encounter  S09.90XA 959.01                          ED Disposition Condition    Discharge Stable        ED Prescriptions     Medication Sig Dispense Start Date End Date Auth. Provider    bacitracin 500 unit/gram ointment Apply topically 2 (two) times daily.  11/19/2020  Rahul Cam MD    methocarbamoL (ROBAXIN) 500 MG Tab Take 1 tablet (500 mg total) by mouth every 6 (six) hours. for 3 days 12 tablet 11/19/2020 11/22/2020 Rahul Cam MD        Follow-up Information     Follow up With Specialties Details Why Contact Info    Sultana Guzman MD Internal Medicine Call in 1 day to schedule an appointment, for re-evaluation of today's complaint, and ongoing care [id number] [facility] [id number]  [phone]                                         Rahul Cam MD  11/20/20 0534

## 2021-01-06 ENCOUNTER — OFFICE VISIT (OUTPATIENT)
Dept: CARDIOLOGY | Facility: CLINIC | Age: 81
End: 2021-01-06
Attending: INTERNAL MEDICINE
Payer: MEDICARE

## 2021-01-06 VITALS
HEART RATE: 60 BPM | DIASTOLIC BLOOD PRESSURE: 67 MMHG | SYSTOLIC BLOOD PRESSURE: 117 MMHG | BODY MASS INDEX: 27.06 KG/M2 | WEIGHT: 116.94 LBS | HEIGHT: 55 IN

## 2021-01-06 DIAGNOSIS — I65.23 BILATERAL CAROTID ARTERY STENOSIS: ICD-10-CM

## 2021-01-06 DIAGNOSIS — Z98.890 HISTORY OF NECK SURGERY: ICD-10-CM

## 2021-01-06 DIAGNOSIS — I10 ESSENTIAL HYPERTENSION: ICD-10-CM

## 2021-01-06 DIAGNOSIS — I77.1 SUBCLAVIAN ARTERY STENOSIS: ICD-10-CM

## 2021-01-06 DIAGNOSIS — I50.32 HEART FAILURE, DIASTOLIC, CHRONIC: ICD-10-CM

## 2021-01-06 DIAGNOSIS — E78.00 HYPERCHOLESTEROLEMIA: ICD-10-CM

## 2021-01-06 DIAGNOSIS — Z87.898 HISTORY OF CHEST PAIN: ICD-10-CM

## 2021-01-06 PROCEDURE — 99214 OFFICE O/P EST MOD 30 MIN: CPT | Mod: S$GLB,,, | Performed by: INTERNAL MEDICINE

## 2021-01-06 PROCEDURE — 1100F PR PT FALLS ASSESS DOC 2+ FALLS/FALL W/INJURY/YR: ICD-10-PCS | Mod: CPTII,S$GLB,, | Performed by: INTERNAL MEDICINE

## 2021-01-06 PROCEDURE — 3288F PR FALLS RISK ASSESSMENT DOCUMENTED: ICD-10-PCS | Mod: CPTII,S$GLB,, | Performed by: INTERNAL MEDICINE

## 2021-01-06 PROCEDURE — 99999 PR PBB SHADOW E&M-EST. PATIENT-LVL III: CPT | Mod: PBBFAC,,, | Performed by: INTERNAL MEDICINE

## 2021-01-06 PROCEDURE — 99999 PR PBB SHADOW E&M-EST. PATIENT-LVL III: ICD-10-PCS | Mod: PBBFAC,,, | Performed by: INTERNAL MEDICINE

## 2021-01-06 PROCEDURE — 3074F SYST BP LT 130 MM HG: CPT | Mod: CPTII,S$GLB,, | Performed by: INTERNAL MEDICINE

## 2021-01-06 PROCEDURE — 1100F PTFALLS ASSESS-DOCD GE2>/YR: CPT | Mod: CPTII,S$GLB,, | Performed by: INTERNAL MEDICINE

## 2021-01-06 PROCEDURE — 1159F PR MEDICATION LIST DOCUMENTED IN MEDICAL RECORD: ICD-10-PCS | Mod: S$GLB,,, | Performed by: INTERNAL MEDICINE

## 2021-01-06 PROCEDURE — 3078F DIAST BP <80 MM HG: CPT | Mod: CPTII,S$GLB,, | Performed by: INTERNAL MEDICINE

## 2021-01-06 PROCEDURE — 3074F PR MOST RECENT SYSTOLIC BLOOD PRESSURE < 130 MM HG: ICD-10-PCS | Mod: CPTII,S$GLB,, | Performed by: INTERNAL MEDICINE

## 2021-01-06 PROCEDURE — 3288F FALL RISK ASSESSMENT DOCD: CPT | Mod: CPTII,S$GLB,, | Performed by: INTERNAL MEDICINE

## 2021-01-06 PROCEDURE — 1159F MED LIST DOCD IN RCRD: CPT | Mod: S$GLB,,, | Performed by: INTERNAL MEDICINE

## 2021-01-06 PROCEDURE — 99214 PR OFFICE/OUTPT VISIT, EST, LEVL IV, 30-39 MIN: ICD-10-PCS | Mod: S$GLB,,, | Performed by: INTERNAL MEDICINE

## 2021-01-06 PROCEDURE — 3078F PR MOST RECENT DIASTOLIC BLOOD PRESSURE < 80 MM HG: ICD-10-PCS | Mod: CPTII,S$GLB,, | Performed by: INTERNAL MEDICINE

## 2021-01-06 RX ORDER — METOPROLOL SUCCINATE 25 MG/1
25 TABLET, EXTENDED RELEASE ORAL DAILY
Qty: 90 TABLET | Refills: 3 | Status: SHIPPED | OUTPATIENT
Start: 2021-01-06 | End: 2021-07-07 | Stop reason: SDUPTHER

## 2021-01-06 RX ORDER — AMLODIPINE BESYLATE 10 MG/1
10 TABLET ORAL DAILY
Qty: 90 TABLET | Refills: 3 | Status: SHIPPED | OUTPATIENT
Start: 2021-01-06 | End: 2021-07-07 | Stop reason: SDUPTHER

## 2021-01-06 RX ORDER — FUROSEMIDE 20 MG/1
20 TABLET ORAL DAILY PRN
Qty: 30 TABLET | Refills: 3 | Status: SHIPPED | OUTPATIENT
Start: 2021-01-06 | End: 2021-07-07 | Stop reason: SDUPTHER

## 2021-01-06 RX ORDER — ASPIRIN 81 MG/1
81 TABLET ORAL DAILY
Qty: 90 TABLET | Refills: 3 | Status: SHIPPED | OUTPATIENT
Start: 2021-01-06 | End: 2021-07-07 | Stop reason: SDUPTHER

## 2021-01-06 RX ORDER — ATORVASTATIN CALCIUM 40 MG/1
40 TABLET, FILM COATED ORAL DAILY
Qty: 90 TABLET | Refills: 3 | Status: SHIPPED | OUTPATIENT
Start: 2021-01-06 | End: 2021-07-07 | Stop reason: SDUPTHER

## 2021-01-06 RX ORDER — OLMESARTAN MEDOXOMIL 40 MG/1
40 TABLET ORAL DAILY
Qty: 90 TABLET | Refills: 3 | Status: SHIPPED | OUTPATIENT
Start: 2021-01-06 | End: 2021-07-07 | Stop reason: SDUPTHER

## 2021-01-09 ENCOUNTER — IMMUNIZATION (OUTPATIENT)
Dept: OBSTETRICS AND GYNECOLOGY | Facility: CLINIC | Age: 81
End: 2021-01-09
Payer: MEDICARE

## 2021-01-09 DIAGNOSIS — Z23 NEED FOR VACCINATION: ICD-10-CM

## 2021-01-09 PROCEDURE — 91300 COVID-19, MRNA, LNP-S, PF, 30 MCG/0.3 ML DOSE VACCINE: CPT | Mod: PBBFAC | Performed by: FAMILY MEDICINE

## 2021-01-30 ENCOUNTER — IMMUNIZATION (OUTPATIENT)
Dept: OBSTETRICS AND GYNECOLOGY | Facility: CLINIC | Age: 81
End: 2021-01-30
Payer: MEDICARE

## 2021-01-30 DIAGNOSIS — Z23 NEED FOR VACCINATION: Primary | ICD-10-CM

## 2021-01-30 PROCEDURE — 0002A PR IMMUNIZ ADMIN, SARS-COV-2 COVID-19 VACC, 30MCG/0.3ML, 2ND DOSE: CPT | Mod: PBBFAC | Performed by: FAMILY MEDICINE

## 2021-01-30 PROCEDURE — 91300 PR SARS-COV- 2 COVID-19 VACCINE, NO PRSV, 30MCG/0.3ML, IM: CPT | Mod: PBBFAC | Performed by: FAMILY MEDICINE

## 2021-01-30 RX ADMIN — RNA INGREDIENT BNT-162B2 0.3 ML: 0.23 INJECTION, SUSPENSION INTRAMUSCULAR at 02:01

## 2021-07-07 ENCOUNTER — OFFICE VISIT (OUTPATIENT)
Dept: CARDIOLOGY | Facility: CLINIC | Age: 81
End: 2021-07-07
Attending: INTERNAL MEDICINE
Payer: MEDICARE

## 2021-07-07 VITALS
BODY MASS INDEX: 27.35 KG/M2 | HEART RATE: 55 BPM | WEIGHT: 118.19 LBS | OXYGEN SATURATION: 97 % | SYSTOLIC BLOOD PRESSURE: 128 MMHG | HEIGHT: 55 IN | DIASTOLIC BLOOD PRESSURE: 58 MMHG

## 2021-07-07 DIAGNOSIS — Z87.898 HISTORY OF CHEST PAIN: ICD-10-CM

## 2021-07-07 DIAGNOSIS — I65.23 BILATERAL CAROTID ARTERY STENOSIS: ICD-10-CM

## 2021-07-07 DIAGNOSIS — Z98.890 HISTORY OF NECK SURGERY: ICD-10-CM

## 2021-07-07 DIAGNOSIS — E78.00 HYPERCHOLESTEROLEMIA: ICD-10-CM

## 2021-07-07 DIAGNOSIS — I77.1 SUBCLAVIAN ARTERY STENOSIS: ICD-10-CM

## 2021-07-07 DIAGNOSIS — I10 ESSENTIAL HYPERTENSION: ICD-10-CM

## 2021-07-07 DIAGNOSIS — I50.32 HEART FAILURE, DIASTOLIC, CHRONIC: ICD-10-CM

## 2021-07-07 PROCEDURE — 99999 PR PBB SHADOW E&M-EST. PATIENT-LVL IV: ICD-10-PCS | Mod: PBBFAC,,, | Performed by: INTERNAL MEDICINE

## 2021-07-07 PROCEDURE — 3288F PR FALLS RISK ASSESSMENT DOCUMENTED: ICD-10-PCS | Mod: CPTII,S$GLB,, | Performed by: INTERNAL MEDICINE

## 2021-07-07 PROCEDURE — 1125F PR PAIN SEVERITY QUANTIFIED, PAIN PRESENT: ICD-10-PCS | Mod: S$GLB,,, | Performed by: INTERNAL MEDICINE

## 2021-07-07 PROCEDURE — 93000 ELECTROCARDIOGRAM COMPLETE: CPT | Mod: S$GLB,,, | Performed by: INTERNAL MEDICINE

## 2021-07-07 PROCEDURE — 99214 PR OFFICE/OUTPT VISIT, EST, LEVL IV, 30-39 MIN: ICD-10-PCS | Mod: 25,S$GLB,, | Performed by: INTERNAL MEDICINE

## 2021-07-07 PROCEDURE — 1101F PT FALLS ASSESS-DOCD LE1/YR: CPT | Mod: CPTII,S$GLB,, | Performed by: INTERNAL MEDICINE

## 2021-07-07 PROCEDURE — 1159F MED LIST DOCD IN RCRD: CPT | Mod: S$GLB,,, | Performed by: INTERNAL MEDICINE

## 2021-07-07 PROCEDURE — 99999 PR PBB SHADOW E&M-EST. PATIENT-LVL IV: CPT | Mod: PBBFAC,,, | Performed by: INTERNAL MEDICINE

## 2021-07-07 PROCEDURE — 1159F PR MEDICATION LIST DOCUMENTED IN MEDICAL RECORD: ICD-10-PCS | Mod: S$GLB,,, | Performed by: INTERNAL MEDICINE

## 2021-07-07 PROCEDURE — 93005 ELECTROCARDIOGRAM TRACING: CPT

## 2021-07-07 PROCEDURE — 99214 OFFICE O/P EST MOD 30 MIN: CPT | Mod: 25,S$GLB,, | Performed by: INTERNAL MEDICINE

## 2021-07-07 PROCEDURE — 1101F PR PT FALLS ASSESS DOC 0-1 FALLS W/OUT INJ PAST YR: ICD-10-PCS | Mod: CPTII,S$GLB,, | Performed by: INTERNAL MEDICINE

## 2021-07-07 PROCEDURE — 3288F FALL RISK ASSESSMENT DOCD: CPT | Mod: CPTII,S$GLB,, | Performed by: INTERNAL MEDICINE

## 2021-07-07 PROCEDURE — 93000 PR ELECTROCARDIOGRAM, COMPLETE: ICD-10-PCS | Mod: S$GLB,,, | Performed by: INTERNAL MEDICINE

## 2021-07-07 PROCEDURE — 1125F AMNT PAIN NOTED PAIN PRSNT: CPT | Mod: S$GLB,,, | Performed by: INTERNAL MEDICINE

## 2021-07-07 RX ORDER — AMLODIPINE BESYLATE 10 MG/1
10 TABLET ORAL DAILY
Qty: 90 TABLET | Refills: 3 | Status: SHIPPED | OUTPATIENT
Start: 2021-07-07 | End: 2022-03-01

## 2021-07-07 RX ORDER — OLMESARTAN MEDOXOMIL 40 MG/1
40 TABLET ORAL DAILY
Qty: 90 TABLET | Refills: 3 | Status: SHIPPED | OUTPATIENT
Start: 2021-07-07 | End: 2022-03-08 | Stop reason: SDUPTHER

## 2021-07-07 RX ORDER — FUROSEMIDE 20 MG/1
20 TABLET ORAL
Qty: 90 TABLET | Refills: 3 | Status: SHIPPED | OUTPATIENT
Start: 2021-07-07 | End: 2022-03-08 | Stop reason: SDUPTHER

## 2021-07-07 RX ORDER — ATORVASTATIN CALCIUM 40 MG/1
40 TABLET, FILM COATED ORAL DAILY
Qty: 90 TABLET | Refills: 3 | Status: SHIPPED | OUTPATIENT
Start: 2021-07-07 | End: 2022-03-08 | Stop reason: SDUPTHER

## 2021-07-07 RX ORDER — ASPIRIN 81 MG/1
81 TABLET ORAL DAILY
Qty: 90 TABLET | Refills: 3 | Status: SHIPPED | OUTPATIENT
Start: 2021-07-07 | End: 2022-03-08 | Stop reason: SDUPTHER

## 2021-07-07 RX ORDER — ALENDRONATE SODIUM 70 MG/1
TABLET ORAL
COMMUNITY
Start: 2018-07-20

## 2021-07-07 RX ORDER — METOPROLOL SUCCINATE 25 MG/1
25 TABLET, EXTENDED RELEASE ORAL DAILY
Qty: 90 TABLET | Refills: 3 | Status: SHIPPED | OUTPATIENT
Start: 2021-07-07 | End: 2022-03-08 | Stop reason: SDUPTHER

## 2021-07-07 RX ORDER — ERGOCALCIFEROL 1.25 MG/1
50000 CAPSULE ORAL
COMMUNITY

## 2021-07-12 ENCOUNTER — HOSPITAL ENCOUNTER (OUTPATIENT)
Dept: CARDIOLOGY | Facility: OTHER | Age: 81
Discharge: HOME | End: 2021-07-12
Attending: INTERNAL MEDICINE
Payer: MEDICARE

## 2021-07-12 DIAGNOSIS — I65.23 BILATERAL CAROTID ARTERY STENOSIS: ICD-10-CM

## 2021-07-12 DIAGNOSIS — I77.1 SUBCLAVIAN ARTERY STENOSIS: ICD-10-CM

## 2021-07-12 LAB
LEFT ARM DIASTOLIC BLOOD PRESSURE: 63 MMHG
LEFT ARM SYSTOLIC BLOOD PRESSURE: 136 MMHG
LEFT CBA DIAS: 8 CM/S
LEFT CBA SYS: 47 CM/S
LEFT CCA DIST DIAS: 14 CM/S
LEFT CCA DIST SYS: 70 CM/S
LEFT CCA MID DIAS: 15 CM/S
LEFT CCA MID SYS: 75 CM/S
LEFT CCA PROX DIAS: 13 CM/S
LEFT CCA PROX SYS: 75 CM/S
LEFT ECA DIAS: 6 CM/S
LEFT ECA SYS: 74 CM/S
LEFT ICA DIST DIAS: 10 CM/S
LEFT ICA DIST SYS: 72 CM/S
LEFT ICA MID DIAS: 35 CM/S
LEFT ICA MID SYS: 133 CM/S
LEFT ICA PROX DIAS: 29 CM/S
LEFT ICA PROX SYS: 106 CM/S
LEFT VERTEBRAL DIAS: 13 CM/S
LEFT VERTEBRAL SYS: 72 CM/S
OHS CV CAROTID RIGHT ICA EDV HIGHEST: 45
OHS CV CAROTID ULTRASOUND LEFT ICA/CCA RATIO: 1.9
OHS CV CAROTID ULTRASOUND RIGHT ICA/CCA RATIO: 2.28
OHS CV PV CAROTID LEFT HIGHEST CCA: 75
OHS CV PV CAROTID LEFT HIGHEST ICA: 133
OHS CV PV CAROTID RIGHT HIGHEST CCA: 110
OHS CV PV CAROTID RIGHT HIGHEST ICA: 153
OHS CV US CAROTID LEFT HIGHEST EDV: 35
RIGHT ARM DIASTOLIC BLOOD PRESSURE: 57 MMHG
RIGHT ARM SYSTOLIC BLOOD PRESSURE: 111 MMHG
RIGHT CBA DIAS: 13 CM/S
RIGHT CBA SYS: 68 CM/S
RIGHT CCA DIST DIAS: 13 CM/S
RIGHT CCA DIST SYS: 67 CM/S
RIGHT CCA MID DIAS: 21 CM/S
RIGHT CCA MID SYS: 110 CM/S
RIGHT CCA PROX DIAS: 10 CM/S
RIGHT CCA PROX SYS: 67 CM/S
RIGHT ECA DIAS: 9 CM/S
RIGHT ECA SYS: 96 CM/S
RIGHT ICA DIST DIAS: 43 CM/S
RIGHT ICA DIST SYS: 142 CM/S
RIGHT ICA MID DIAS: 45 CM/S
RIGHT ICA MID SYS: 153 CM/S
RIGHT ICA PROX DIAS: 27 CM/S
RIGHT ICA PROX SYS: 98 CM/S
RIGHT VERTEBRAL DIAS: 9 CM/S
RIGHT VERTEBRAL SYS: 54 CM/S

## 2021-07-12 PROCEDURE — 93880 CV US DOPPLER CAROTID (CUPID ONLY): ICD-10-PCS | Mod: 26,,, | Performed by: INTERNAL MEDICINE

## 2021-07-12 PROCEDURE — 93880 EXTRACRANIAL BILAT STUDY: CPT

## 2021-07-12 PROCEDURE — 93880 EXTRACRANIAL BILAT STUDY: CPT | Mod: 26,,, | Performed by: INTERNAL MEDICINE

## 2021-07-16 ENCOUNTER — PATIENT MESSAGE (OUTPATIENT)
Dept: CARDIOLOGY | Facility: CLINIC | Age: 81
End: 2021-07-16

## 2021-07-29 ENCOUNTER — TELEPHONE (OUTPATIENT)
Dept: CARDIOLOGY | Facility: CLINIC | Age: 81
End: 2021-07-29

## 2021-08-17 ENCOUNTER — TELEPHONE (OUTPATIENT)
Dept: CARDIOLOGY | Facility: CLINIC | Age: 81
End: 2021-08-17

## 2021-12-30 ENCOUNTER — TELEPHONE (OUTPATIENT)
Dept: CARDIOLOGY | Facility: CLINIC | Age: 81
End: 2021-12-30
Payer: MEDICARE
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
## 2022-02-08 ENCOUNTER — HOSPITAL ENCOUNTER (EMERGENCY)
Facility: HOSPITAL | Age: 82
Discharge: HOME | End: 2022-02-08
Attending: EMERGENCY MEDICINE
Payer: MEDICARE

## 2022-02-08 VITALS
RESPIRATION RATE: 18 BRPM | HEART RATE: 56 BPM | SYSTOLIC BLOOD PRESSURE: 146 MMHG | DIASTOLIC BLOOD PRESSURE: 60 MMHG | TEMPERATURE: 98 F | OXYGEN SATURATION: 100 %

## 2022-02-08 DIAGNOSIS — S51.812A LACERATION OF LEFT FOREARM, INITIAL ENCOUNTER: ICD-10-CM

## 2022-02-08 DIAGNOSIS — M79.5 FOREIGN BODY (FB) IN SOFT TISSUE: ICD-10-CM
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
## 2022-02-08 DIAGNOSIS — S50.12XA CONTUSION OF LEFT FOREARM, INITIAL ENCOUNTER: Primary | ICD-10-CM

## 2022-02-08 PROBLEM — S22.20XA STERNAL FRACTURE: Status: ACTIVE | Noted: 2021-11-22

## 2022-02-08 PROBLEM — K63.1 SMALL BOWEL PERFORATION: Status: ACTIVE | Noted: 2021-11-22

## 2022-02-08 PROCEDURE — 12001 RPR S/N/AX/GEN/TRNK 2.5CM/<: CPT | Mod: LT,ER

## 2022-02-08 PROCEDURE — 90471 IMMUNIZATION ADMIN: CPT | Mod: ER | Performed by: EMERGENCY MEDICINE

## 2022-02-08 PROCEDURE — 90715 TDAP VACCINE 7 YRS/> IM: CPT | Mod: ER | Performed by: EMERGENCY MEDICINE

## 2022-02-08 PROCEDURE — 63600175 PHARM REV CODE 636 W HCPCS: Mod: ER | Performed by: EMERGENCY MEDICINE

## 2022-02-08 PROCEDURE — 99284 EMERGENCY DEPT VISIT MOD MDM: CPT | Mod: 25,ER

## 2022-02-08 RX ORDER — CEPHALEXIN 250 MG/5ML
250 POWDER, FOR SUSPENSION ORAL 4 TIMES DAILY
Qty: 140 ML | Refills: 0 | Status: SHIPPED | OUTPATIENT
Start: 2022-02-08 | End: 2022-02-15

## 2022-02-08 RX ADMIN — TETANUS TOXOID, REDUCED DIPHTHERIA TOXOID AND ACELLULAR PERTUSSIS VACCINE, ADSORBED 0.5 ML: 5; 2.5; 8; 8; 2.5 SUSPENSION INTRAMUSCULAR at 09:02

## 2022-02-09 ENCOUNTER — PES CALL (OUTPATIENT)
Dept: ADMINISTRATIVE | Facility: CLINIC | Age: 82
End: 2022-02-09
Payer: MEDICARE
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
## 2022-03-08 ENCOUNTER — OFFICE VISIT (OUTPATIENT)
Dept: CARDIOLOGY | Facility: CLINIC | Age: 82
End: 2022-03-08
Attending: INTERNAL MEDICINE
Payer: MEDICARE

## 2022-03-08 VITALS
SYSTOLIC BLOOD PRESSURE: 128 MMHG | WEIGHT: 105.81 LBS | BODY MASS INDEX: 24.48 KG/M2 | HEIGHT: 55 IN | DIASTOLIC BLOOD PRESSURE: 75 MMHG | HEART RATE: 59 BPM

## 2022-03-08 DIAGNOSIS — I50.32 HEART FAILURE, DIASTOLIC, CHRONIC: ICD-10-CM

## 2022-03-08 DIAGNOSIS — E78.00 HYPERCHOLESTEROLEMIA: ICD-10-CM

## 2022-03-08 DIAGNOSIS — Z98.890 HISTORY OF NECK SURGERY: ICD-10-CM

## 2022-03-08 DIAGNOSIS — Z87.898 HISTORY OF CHEST PAIN: ICD-10-CM

## 2022-03-08 DIAGNOSIS — I65.23 BILATERAL CAROTID ARTERY STENOSIS: ICD-10-CM

## 2022-03-08 DIAGNOSIS — I10 ESSENTIAL HYPERTENSION: ICD-10-CM

## 2022-03-08 DIAGNOSIS — I10 PRIMARY HYPERTENSION: ICD-10-CM

## 2022-03-08 DIAGNOSIS — I77.1 SUBCLAVIAN ARTERY STENOSIS: ICD-10-CM

## 2022-03-08 PROCEDURE — 1160F RVW MEDS BY RX/DR IN RCRD: CPT | Mod: CPTII,S$GLB,, | Performed by: INTERNAL MEDICINE

## 2022-03-08 PROCEDURE — 99214 PR OFFICE/OUTPT VISIT, EST, LEVL IV, 30-39 MIN: ICD-10-PCS | Mod: S$GLB,,, | Performed by: INTERNAL MEDICINE

## 2022-03-08 PROCEDURE — 3074F PR MOST RECENT SYSTOLIC BLOOD PRESSURE < 130 MM HG: ICD-10-PCS | Mod: CPTII,S$GLB,, | Performed by: INTERNAL MEDICINE

## 2022-03-08 PROCEDURE — 1159F MED LIST DOCD IN RCRD: CPT | Mod: CPTII,S$GLB,, | Performed by: INTERNAL MEDICINE

## 2022-03-08 PROCEDURE — 99999 PR PBB SHADOW E&M-EST. PATIENT-LVL III: ICD-10-PCS | Mod: PBBFAC,,, | Performed by: INTERNAL MEDICINE

## 2022-03-08 PROCEDURE — 3074F SYST BP LT 130 MM HG: CPT | Mod: CPTII,S$GLB,, | Performed by: INTERNAL MEDICINE

## 2022-03-08 PROCEDURE — 1160F PR REVIEW ALL MEDS BY PRESCRIBER/CLIN PHARMACIST DOCUMENTED: ICD-10-PCS | Mod: CPTII,S$GLB,, | Performed by: INTERNAL MEDICINE

## 2022-03-08 PROCEDURE — 99999 PR PBB SHADOW E&M-EST. PATIENT-LVL III: CPT | Mod: PBBFAC,,, | Performed by: INTERNAL MEDICINE

## 2022-03-08 PROCEDURE — 99214 OFFICE O/P EST MOD 30 MIN: CPT | Mod: S$GLB,,, | Performed by: INTERNAL MEDICINE

## 2022-03-08 PROCEDURE — 3078F DIAST BP <80 MM HG: CPT | Mod: CPTII,S$GLB,, | Performed by: INTERNAL MEDICINE

## 2022-03-08 PROCEDURE — 1159F PR MEDICATION LIST DOCUMENTED IN MEDICAL RECORD: ICD-10-PCS | Mod: CPTII,S$GLB,, | Performed by: INTERNAL MEDICINE

## 2022-03-08 PROCEDURE — 3078F PR MOST RECENT DIASTOLIC BLOOD PRESSURE < 80 MM HG: ICD-10-PCS | Mod: CPTII,S$GLB,, | Performed by: INTERNAL MEDICINE

## 2022-03-08 RX ORDER — FUROSEMIDE 20 MG/1
20 TABLET ORAL
Qty: 90 TABLET | Refills: 3 | Status: SHIPPED | OUTPATIENT
Start: 2022-03-09 | End: 2023-03-10 | Stop reason: SDUPTHER

## 2022-03-08 RX ORDER — ASPIRIN 81 MG/1
81 TABLET ORAL DAILY
Qty: 90 TABLET | Refills: 3 | Status: SHIPPED | OUTPATIENT
Start: 2022-03-08 | End: 2023-03-10 | Stop reason: SDUPTHER

## 2022-03-08 RX ORDER — OLMESARTAN MEDOXOMIL 40 MG/1
40 TABLET ORAL DAILY
Qty: 90 TABLET | Refills: 3 | Status: SHIPPED | OUTPATIENT
Start: 2022-03-08 | End: 2023-03-10 | Stop reason: SDUPTHER

## 2022-03-08 RX ORDER — METOPROLOL SUCCINATE 25 MG/1
25 TABLET, EXTENDED RELEASE ORAL DAILY
Qty: 90 TABLET | Refills: 3 | Status: SHIPPED | OUTPATIENT
Start: 2022-03-08 | End: 2023-01-19

## 2022-03-08 RX ORDER — AMLODIPINE BESYLATE 10 MG/1
10 TABLET ORAL DAILY
Qty: 90 TABLET | Refills: 3 | Status: SHIPPED | OUTPATIENT
Start: 2022-03-08 | End: 2023-03-10 | Stop reason: SDUPTHER

## 2022-03-08 RX ORDER — ATORVASTATIN CALCIUM 40 MG/1
40 TABLET, FILM COATED ORAL DAILY
Qty: 90 TABLET | Refills: 3 | Status: SHIPPED | OUTPATIENT
Start: 2022-03-08 | End: 2023-03-10 | Stop reason: SDUPTHER
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
## 2022-09-08 ENCOUNTER — OFFICE VISIT (OUTPATIENT)
Dept: CARDIOLOGY | Facility: CLINIC | Age: 82
End: 2022-09-08
Attending: INTERNAL MEDICINE
Payer: MEDICARE

## 2022-09-08 ENCOUNTER — TELEPHONE (OUTPATIENT)
Dept: CARDIOLOGY | Facility: CLINIC | Age: 82
End: 2022-09-08

## 2022-09-08 VITALS
DIASTOLIC BLOOD PRESSURE: 86 MMHG | OXYGEN SATURATION: 98 % | BODY MASS INDEX: 24.89 KG/M2 | SYSTOLIC BLOOD PRESSURE: 146 MMHG | HEART RATE: 50 BPM | WEIGHT: 107.56 LBS | HEIGHT: 55 IN

## 2022-09-08 DIAGNOSIS — I65.23 BILATERAL CAROTID ARTERY STENOSIS: ICD-10-CM

## 2022-09-08 DIAGNOSIS — E78.00 HYPERCHOLESTEROLEMIA: ICD-10-CM

## 2022-09-08 DIAGNOSIS — I77.1 SUBCLAVIAN ARTERY STENOSIS: ICD-10-CM

## 2022-09-08 DIAGNOSIS — Z87.898 HISTORY OF CHEST PAIN: ICD-10-CM

## 2022-09-08 DIAGNOSIS — Z98.890 HISTORY OF NECK SURGERY: ICD-10-CM

## 2022-09-08 DIAGNOSIS — I50.32 HEART FAILURE, DIASTOLIC, CHRONIC: ICD-10-CM

## 2022-09-08 DIAGNOSIS — R42 DIZZINESS: ICD-10-CM

## 2022-09-08 DIAGNOSIS — I10 PRIMARY HYPERTENSION: ICD-10-CM

## 2022-09-08 PROCEDURE — 1125F AMNT PAIN NOTED PAIN PRSNT: CPT | Mod: CPTII,S$GLB,, | Performed by: INTERNAL MEDICINE

## 2022-09-08 PROCEDURE — 3077F SYST BP >= 140 MM HG: CPT | Mod: CPTII,S$GLB,, | Performed by: INTERNAL MEDICINE

## 2022-09-08 PROCEDURE — 1159F PR MEDICATION LIST DOCUMENTED IN MEDICAL RECORD: ICD-10-PCS | Mod: CPTII,S$GLB,, | Performed by: INTERNAL MEDICINE

## 2022-09-08 PROCEDURE — 99214 OFFICE O/P EST MOD 30 MIN: CPT | Mod: S$GLB,,, | Performed by: INTERNAL MEDICINE

## 2022-09-08 PROCEDURE — 99999 PR PBB SHADOW E&M-EST. PATIENT-LVL IV: CPT | Mod: PBBFAC,,, | Performed by: INTERNAL MEDICINE

## 2022-09-08 PROCEDURE — 1159F MED LIST DOCD IN RCRD: CPT | Mod: CPTII,S$GLB,, | Performed by: INTERNAL MEDICINE

## 2022-09-08 PROCEDURE — 3288F PR FALLS RISK ASSESSMENT DOCUMENTED: ICD-10-PCS | Mod: CPTII,S$GLB,, | Performed by: INTERNAL MEDICINE

## 2022-09-08 PROCEDURE — 3079F DIAST BP 80-89 MM HG: CPT | Mod: CPTII,S$GLB,, | Performed by: INTERNAL MEDICINE

## 2022-09-08 PROCEDURE — 3288F FALL RISK ASSESSMENT DOCD: CPT | Mod: CPTII,S$GLB,, | Performed by: INTERNAL MEDICINE

## 2022-09-08 PROCEDURE — 3079F PR MOST RECENT DIASTOLIC BLOOD PRESSURE 80-89 MM HG: ICD-10-PCS | Mod: CPTII,S$GLB,, | Performed by: INTERNAL MEDICINE

## 2022-09-08 PROCEDURE — 3077F PR MOST RECENT SYSTOLIC BLOOD PRESSURE >= 140 MM HG: ICD-10-PCS | Mod: CPTII,S$GLB,, | Performed by: INTERNAL MEDICINE

## 2022-09-08 PROCEDURE — 1101F PT FALLS ASSESS-DOCD LE1/YR: CPT | Mod: CPTII,S$GLB,, | Performed by: INTERNAL MEDICINE

## 2022-09-08 PROCEDURE — 99214 PR OFFICE/OUTPT VISIT, EST, LEVL IV, 30-39 MIN: ICD-10-PCS | Mod: S$GLB,,, | Performed by: INTERNAL MEDICINE

## 2022-09-08 PROCEDURE — 1101F PR PT FALLS ASSESS DOC 0-1 FALLS W/OUT INJ PAST YR: ICD-10-PCS | Mod: CPTII,S$GLB,, | Performed by: INTERNAL MEDICINE

## 2022-09-08 PROCEDURE — 99999 PR PBB SHADOW E&M-EST. PATIENT-LVL IV: ICD-10-PCS | Mod: PBBFAC,,, | Performed by: INTERNAL MEDICINE

## 2022-09-08 PROCEDURE — 1125F PR PAIN SEVERITY QUANTIFIED, PAIN PRESENT: ICD-10-PCS | Mod: CPTII,S$GLB,, | Performed by: INTERNAL MEDICINE

## 2022-09-08 RX ORDER — MECLIZINE HYDROCHLORIDE 25 MG/1
25 TABLET ORAL 3 TIMES DAILY PRN
Qty: 30 TABLET | Refills: 11 | Status: SHIPPED | OUTPATIENT
Start: 2022-09-08 | End: 2023-09-20

## 2022-09-08 RX ORDER — OMEPRAZOLE 20 MG/1
20 TABLET, DELAYED RELEASE ORAL
COMMUNITY

## 2022-09-08 NOTE — TELEPHONE ENCOUNTER
Spoke to daughter Furosemide dosage is 20 mg MWF.            ----- Message from Antonieta Betancur sent at 9/8/2022  4:22 PM CDT -----  Name of Who is Calling: daughter called on behalf of [employer] [facility] [[id number]]            What is the request in detail: Patient is requesting call back bc she has to different dosage and instructions on  furosemide (LASIX) 20 MG tablet              Can the clinic reply by [facility]: no              [facility] [phone] daughter
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
## 2022-09-19 ENCOUNTER — PATIENT MESSAGE (OUTPATIENT)
Dept: CARDIOLOGY | Facility: CLINIC | Age: 82
End: 2022-09-19
Payer: MEDICARE

## 2022-10-05 ENCOUNTER — HOSPITAL ENCOUNTER (OUTPATIENT)
Dept: CARDIOLOGY | Facility: OTHER | Age: 82
Discharge: HOME | End: 2022-10-05
Attending: INTERNAL MEDICINE
Payer: MEDICARE

## 2022-10-05 DIAGNOSIS — I65.23 BILATERAL CAROTID ARTERY STENOSIS: ICD-10-CM

## 2022-10-05 LAB
LEFT ARM DIASTOLIC BLOOD PRESSURE: 67 MMHG
LEFT ARM SYSTOLIC BLOOD PRESSURE: 148 MMHG
LEFT CBA DIAS: 11 CM/S
LEFT CBA SYS: 59 CM/S
LEFT CCA DIST DIAS: 13 CM/S
LEFT CCA DIST SYS: 79 CM/S
LEFT CCA MID DIAS: 12 CM/S
LEFT CCA MID SYS: 76 CM/S
LEFT CCA PROX DIAS: 14 CM/S
LEFT CCA PROX SYS: 79 CM/S
LEFT ECA DIAS: 8 CM/S
LEFT ECA SYS: 111 CM/S
LEFT ICA DIST DIAS: 12 CM/S
LEFT ICA DIST SYS: 94 CM/S
LEFT ICA MID DIAS: 34 CM/S
LEFT ICA MID SYS: 130 CM/S
LEFT ICA PROX DIAS: 34 CM/S
LEFT ICA PROX SYS: 129 CM/S
LEFT VERTEBRAL DIAS: 8 CM/S
LEFT VERTEBRAL SYS: 51 CM/S
OHS CV CAROTID RIGHT ICA EDV HIGHEST: 37
OHS CV CAROTID ULTRASOUND LEFT ICA/CCA RATIO: 1.65
OHS CV CAROTID ULTRASOUND RIGHT ICA/CCA RATIO: 1.93
OHS CV PV CAROTID LEFT HIGHEST CCA: 79
OHS CV PV CAROTID LEFT HIGHEST ICA: 130
OHS CV PV CAROTID RIGHT HIGHEST CCA: 75
OHS CV PV CAROTID RIGHT HIGHEST ICA: 145
OHS CV US CAROTID LEFT HIGHEST EDV: 34
RIGHT ARM DIASTOLIC BLOOD PRESSURE: 56 MMHG
RIGHT ARM SYSTOLIC BLOOD PRESSURE: 123 MMHG
RIGHT CBA DIAS: 13 CM/S
RIGHT CBA SYS: 71 CM/S
RIGHT CCA DIST DIAS: 13 CM/S
RIGHT CCA DIST SYS: 75 CM/S
RIGHT CCA MID DIAS: 8 CM/S
RIGHT CCA MID SYS: 75 CM/S
RIGHT CCA PROX DIAS: 7 CM/S
RIGHT CCA PROX SYS: 63 CM/S
RIGHT ECA DIAS: 8 CM/S
RIGHT ECA SYS: 116 CM/S
RIGHT ICA DIST DIAS: 20 CM/S
RIGHT ICA DIST SYS: 108 CM/S
RIGHT ICA MID DIAS: 37 CM/S
RIGHT ICA MID SYS: 145 CM/S
RIGHT ICA PROX DIAS: 21 CM/S
RIGHT ICA PROX SYS: 90 CM/S
RIGHT VERTEBRAL DIAS: 8 CM/S
RIGHT VERTEBRAL SYS: 81 CM/S

## 2022-10-05 PROCEDURE — 93880 EXTRACRANIAL BILAT STUDY: CPT

## 2022-10-05 PROCEDURE — 93880 CV US DOPPLER CAROTID (CUPID ONLY): ICD-10-PCS | Mod: 26,,, | Performed by: INTERNAL MEDICINE

## 2022-10-05 PROCEDURE — 93880 EXTRACRANIAL BILAT STUDY: CPT | Mod: 26,,, | Performed by: INTERNAL MEDICINE

## 2023-01-20 ENCOUNTER — OFFICE VISIT (OUTPATIENT)
Dept: CARDIOLOGY | Facility: CLINIC | Age: 83
End: 2023-01-20
Attending: INTERNAL MEDICINE
Payer: MEDICARE

## 2023-01-20 VITALS
DIASTOLIC BLOOD PRESSURE: 68 MMHG | OXYGEN SATURATION: 97 % | HEART RATE: 80 BPM | SYSTOLIC BLOOD PRESSURE: 175 MMHG | HEIGHT: 55 IN | WEIGHT: 113.75 LBS | BODY MASS INDEX: 26.33 KG/M2

## 2023-01-20 DIAGNOSIS — I65.23 BILATERAL CAROTID ARTERY STENOSIS: ICD-10-CM

## 2023-01-20 DIAGNOSIS — R42 DIZZINESS: ICD-10-CM

## 2023-01-20 DIAGNOSIS — I50.32 HEART FAILURE, DIASTOLIC, CHRONIC: ICD-10-CM

## 2023-01-20 DIAGNOSIS — Z87.898 HISTORY OF CHEST PAIN: ICD-10-CM

## 2023-01-20 DIAGNOSIS — I77.1 SUBCLAVIAN ARTERY STENOSIS: ICD-10-CM

## 2023-01-20 DIAGNOSIS — Z98.890 HISTORY OF NECK SURGERY: ICD-10-CM

## 2023-01-20 DIAGNOSIS — E78.00 HYPERCHOLESTEROLEMIA: ICD-10-CM

## 2023-01-20 DIAGNOSIS — I10 PRIMARY HYPERTENSION: ICD-10-CM

## 2023-01-20 PROCEDURE — 99214 OFFICE O/P EST MOD 30 MIN: CPT | Mod: S$GLB,,, | Performed by: INTERNAL MEDICINE

## 2023-01-20 PROCEDURE — 99999 PR PBB SHADOW E&M-EST. PATIENT-LVL V: CPT | Mod: PBBFAC,,, | Performed by: INTERNAL MEDICINE

## 2023-01-20 PROCEDURE — 99214 PR OFFICE/OUTPT VISIT, EST, LEVL IV, 30-39 MIN: ICD-10-PCS | Mod: S$GLB,,, | Performed by: INTERNAL MEDICINE

## 2023-01-20 PROCEDURE — 1159F PR MEDICATION LIST DOCUMENTED IN MEDICAL RECORD: ICD-10-PCS | Mod: CPTII,S$GLB,, | Performed by: INTERNAL MEDICINE

## 2023-01-20 PROCEDURE — 3077F SYST BP >= 140 MM HG: CPT | Mod: CPTII,S$GLB,, | Performed by: INTERNAL MEDICINE

## 2023-01-20 PROCEDURE — 3078F DIAST BP <80 MM HG: CPT | Mod: CPTII,S$GLB,, | Performed by: INTERNAL MEDICINE

## 2023-01-20 PROCEDURE — 3078F PR MOST RECENT DIASTOLIC BLOOD PRESSURE < 80 MM HG: ICD-10-PCS | Mod: CPTII,S$GLB,, | Performed by: INTERNAL MEDICINE

## 2023-01-20 PROCEDURE — 1125F AMNT PAIN NOTED PAIN PRSNT: CPT | Mod: CPTII,S$GLB,, | Performed by: INTERNAL MEDICINE

## 2023-01-20 PROCEDURE — 1160F RVW MEDS BY RX/DR IN RCRD: CPT | Mod: CPTII,S$GLB,, | Performed by: INTERNAL MEDICINE

## 2023-01-20 PROCEDURE — 1159F MED LIST DOCD IN RCRD: CPT | Mod: CPTII,S$GLB,, | Performed by: INTERNAL MEDICINE

## 2023-01-20 PROCEDURE — 1125F PR PAIN SEVERITY QUANTIFIED, PAIN PRESENT: ICD-10-PCS | Mod: CPTII,S$GLB,, | Performed by: INTERNAL MEDICINE

## 2023-01-20 PROCEDURE — 99999 PR PBB SHADOW E&M-EST. PATIENT-LVL V: ICD-10-PCS | Mod: PBBFAC,,, | Performed by: INTERNAL MEDICINE

## 2023-01-20 PROCEDURE — 1160F PR REVIEW ALL MEDS BY PRESCRIBER/CLIN PHARMACIST DOCUMENTED: ICD-10-PCS | Mod: CPTII,S$GLB,, | Performed by: INTERNAL MEDICINE

## 2023-01-20 PROCEDURE — 3077F PR MOST RECENT SYSTOLIC BLOOD PRESSURE >= 140 MM HG: ICD-10-PCS | Mod: CPTII,S$GLB,, | Performed by: INTERNAL MEDICINE
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
## 2023-02-04 LAB
BNP SERPL-MCNC: 81 PG/ML
BUN SERPL-MCNC: 19 MG/DL (ref 7–25)
BUN/CREAT SERPL: NORMAL (CALC) (ref 6–22)
CALCIUM SERPL-MCNC: 9.6 MG/DL (ref 8.6–10.4)
CHLORIDE SERPL-SCNC: 109 MMOL/L (ref 98–110)
CHOLEST SERPL-MCNC: 166 MG/DL
CHOLEST/HDLC SERPL: 4.2 (CALC)
CO2 SERPL-SCNC: 25 MMOL/L (ref 20–32)
CREAT SERPL-MCNC: 0.7 MG/DL (ref 0.6–0.95)
EGFR: 86 ML/MIN/1.73M2
GLUCOSE SERPL-MCNC: 95 MG/DL (ref 65–99)
HDLC SERPL-MCNC: 40 MG/DL
LDLC SERPL CALC-MCNC: 93 MG/DL (CALC)
NONHDLC SERPL-MCNC: 126 MG/DL (CALC)
POTASSIUM SERPL-SCNC: 4.3 MMOL/L (ref 3.5–5.3)
SODIUM SERPL-SCNC: 142 MMOL/L (ref 135–146)
TRIGL SERPL-MCNC: 250 MG/DL

## 2023-02-06 ENCOUNTER — TELEPHONE (OUTPATIENT)
Dept: CARDIOLOGY | Facility: CLINIC | Age: 83
End: 2023-02-06
Payer: MEDICARE

## 2023-02-06 NOTE — TELEPHONE ENCOUNTER
Left a detailed message on voice mail.          ----- Message from Tammy Walton MD sent at 2/6/2023  8:16 AM CST -----  Favorable lipid panel.    [facility].    Tammy Walton M.D.

## 2023-03-10 ENCOUNTER — OFFICE VISIT (OUTPATIENT)
Dept: CARDIOLOGY | Facility: CLINIC | Age: 83
End: 2023-03-10
Attending: INTERNAL MEDICINE
Payer: MEDICARE

## 2023-03-10 VITALS
SYSTOLIC BLOOD PRESSURE: 136 MMHG | OXYGEN SATURATION: 96 % | BODY MASS INDEX: 26.02 KG/M2 | WEIGHT: 112.44 LBS | DIASTOLIC BLOOD PRESSURE: 70 MMHG | HEIGHT: 55 IN | HEART RATE: 55 BPM

## 2023-03-10 DIAGNOSIS — Z87.898 HISTORY OF CHEST PAIN: ICD-10-CM

## 2023-03-10 DIAGNOSIS — I50.32 HEART FAILURE, DIASTOLIC, CHRONIC: ICD-10-CM

## 2023-03-10 DIAGNOSIS — E66.3 OVERWEIGHT: ICD-10-CM

## 2023-03-10 DIAGNOSIS — R42 DIZZINESS: ICD-10-CM

## 2023-03-10 DIAGNOSIS — I65.23 BILATERAL CAROTID ARTERY STENOSIS: ICD-10-CM

## 2023-03-10 DIAGNOSIS — I77.1 SUBCLAVIAN ARTERY STENOSIS: ICD-10-CM

## 2023-03-10 DIAGNOSIS — E78.00 HYPERCHOLESTEROLEMIA: ICD-10-CM

## 2023-03-10 DIAGNOSIS — Z98.890 HISTORY OF NECK SURGERY: ICD-10-CM

## 2023-03-10 DIAGNOSIS — I10 PRIMARY HYPERTENSION: ICD-10-CM

## 2023-03-10 PROCEDURE — 93010 ELECTROCARDIOGRAM REPORT: CPT | Mod: S$GLB,,, | Performed by: INTERNAL MEDICINE

## 2023-03-10 PROCEDURE — 99214 OFFICE O/P EST MOD 30 MIN: CPT | Mod: S$PBB,25,, | Performed by: INTERNAL MEDICINE

## 2023-03-10 PROCEDURE — 99999 PR PBB SHADOW E&M-EST. PATIENT-LVL III: ICD-10-PCS | Mod: PBBFAC,,, | Performed by: INTERNAL MEDICINE

## 2023-03-10 PROCEDURE — 93010 ELECTROCARDIOGRAM REPORT: CPT | Mod: S$PBB,,, | Performed by: INTERNAL MEDICINE

## 2023-03-10 PROCEDURE — 1159F MED LIST DOCD IN RCRD: CPT | Mod: CPTII,,, | Performed by: INTERNAL MEDICINE

## 2023-03-10 PROCEDURE — 99214 PR OFFICE/OUTPT VISIT, EST, LEVL IV, 30-39 MIN: ICD-10-PCS | Mod: S$PBB,25,, | Performed by: INTERNAL MEDICINE

## 2023-03-10 PROCEDURE — 99999 PR PBB SHADOW E&M-EST. PATIENT-LVL III: CPT | Mod: PBBFAC,,, | Performed by: INTERNAL MEDICINE

## 2023-03-10 PROCEDURE — 3075F SYST BP GE 130 - 139MM HG: CPT | Mod: CPTII,,, | Performed by: INTERNAL MEDICINE

## 2023-03-10 PROCEDURE — 99213 OFFICE O/P EST LOW 20 MIN: CPT | Mod: PBBFAC | Performed by: INTERNAL MEDICINE

## 2023-03-10 PROCEDURE — 1101F PT FALLS ASSESS-DOCD LE1/YR: CPT | Mod: CPTII,,, | Performed by: INTERNAL MEDICINE

## 2023-03-10 PROCEDURE — 3075F PR MOST RECENT SYSTOLIC BLOOD PRESS GE 130-139MM HG: ICD-10-PCS | Mod: CPTII,,, | Performed by: INTERNAL MEDICINE

## 2023-03-10 PROCEDURE — 3078F PR MOST RECENT DIASTOLIC BLOOD PRESSURE < 80 MM HG: ICD-10-PCS | Mod: CPTII,,, | Performed by: INTERNAL MEDICINE

## 2023-03-10 PROCEDURE — 93005 ELECTROCARDIOGRAM TRACING: CPT | Mod: PBBFAC | Performed by: INTERNAL MEDICINE

## 2023-03-10 PROCEDURE — 3288F FALL RISK ASSESSMENT DOCD: CPT | Mod: CPTII,,, | Performed by: INTERNAL MEDICINE

## 2023-03-10 PROCEDURE — 1159F PR MEDICATION LIST DOCUMENTED IN MEDICAL RECORD: ICD-10-PCS | Mod: CPTII,,, | Performed by: INTERNAL MEDICINE

## 2023-03-10 PROCEDURE — 1160F PR REVIEW ALL MEDS BY PRESCRIBER/CLIN PHARMACIST DOCUMENTED: ICD-10-PCS | Mod: CPTII,,, | Performed by: INTERNAL MEDICINE

## 2023-03-10 PROCEDURE — 3078F DIAST BP <80 MM HG: CPT | Mod: CPTII,,, | Performed by: INTERNAL MEDICINE

## 2023-03-10 PROCEDURE — 3288F PR FALLS RISK ASSESSMENT DOCUMENTED: ICD-10-PCS | Mod: CPTII,,, | Performed by: INTERNAL MEDICINE

## 2023-03-10 PROCEDURE — 93005 ELECTROCARDIOGRAM TRACING: CPT

## 2023-03-10 PROCEDURE — 1160F RVW MEDS BY RX/DR IN RCRD: CPT | Mod: CPTII,,, | Performed by: INTERNAL MEDICINE

## 2023-03-10 PROCEDURE — 1101F PR PT FALLS ASSESS DOC 0-1 FALLS W/OUT INJ PAST YR: ICD-10-PCS | Mod: CPTII,,, | Performed by: INTERNAL MEDICINE

## 2023-03-10 PROCEDURE — 93010 PR ELECTROCARDIOGRAM REPORT: ICD-10-PCS | Mod: S$PBB,,, | Performed by: INTERNAL MEDICINE

## 2023-03-10 RX ORDER — EZETIMIBE 10 MG/1
10 TABLET ORAL DAILY
Qty: 90 TABLET | Refills: 3 | Status: SHIPPED | OUTPATIENT
Start: 2023-03-10 | End: 2024-02-12 | Stop reason: SDUPTHER

## 2023-03-10 RX ORDER — AMLODIPINE BESYLATE 10 MG/1
10 TABLET ORAL DAILY
Qty: 90 TABLET | Refills: 3 | Status: SHIPPED | OUTPATIENT
Start: 2023-03-10 | End: 2023-05-25

## 2023-03-10 RX ORDER — ASPIRIN 81 MG/1
81 TABLET ORAL DAILY
Qty: 90 TABLET | Refills: 3 | Status: SHIPPED | OUTPATIENT
Start: 2023-03-10 | End: 2024-02-12 | Stop reason: SDUPTHER

## 2023-03-10 RX ORDER — ATORVASTATIN CALCIUM 40 MG/1
40 TABLET, FILM COATED ORAL DAILY
Qty: 90 TABLET | Refills: 3 | Status: SHIPPED | OUTPATIENT
Start: 2023-03-10 | End: 2023-04-11

## 2023-03-10 RX ORDER — FUROSEMIDE 20 MG/1
20 TABLET ORAL
Qty: 90 TABLET | Refills: 3 | Status: SHIPPED | OUTPATIENT
Start: 2023-03-10 | End: 2024-02-12 | Stop reason: SDUPTHER

## 2023-03-10 RX ORDER — OLMESARTAN MEDOXOMIL 40 MG/1
40 TABLET ORAL DAILY
Qty: 90 TABLET | Refills: 3 | Status: SHIPPED | OUTPATIENT
Start: 2023-03-10 | End: 2024-02-12 | Stop reason: SDUPTHER

## 2023-03-10 RX ORDER — METOPROLOL SUCCINATE 25 MG/1
25 TABLET, EXTENDED RELEASE ORAL DAILY
Qty: 90 TABLET | Refills: 3 | Status: SHIPPED | OUTPATIENT
Start: 2023-03-10 | End: 2024-02-12 | Stop reason: SDUPTHER
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
## 2023-05-25 DIAGNOSIS — I10 PRIMARY HYPERTENSION: ICD-10-CM

## 2023-05-25 RX ORDER — AMLODIPINE BESYLATE 10 MG/1
TABLET ORAL
Qty: 90 TABLET | Refills: 3 | Status: SHIPPED | OUTPATIENT
Start: 2023-05-25 | End: 2024-02-12 | Stop reason: SDUPTHER

## 2023-07-10 ENCOUNTER — OFFICE VISIT (OUTPATIENT)
Dept: CARDIOLOGY | Facility: CLINIC | Age: 83
End: 2023-07-10
Attending: INTERNAL MEDICINE
Payer: MEDICARE

## 2023-07-10 VITALS
WEIGHT: 112.44 LBS | DIASTOLIC BLOOD PRESSURE: 60 MMHG | OXYGEN SATURATION: 97 % | SYSTOLIC BLOOD PRESSURE: 128 MMHG | HEART RATE: 69 BPM | BODY MASS INDEX: 26.02 KG/M2 | HEIGHT: 55 IN

## 2023-07-10 DIAGNOSIS — I77.1 SUBCLAVIAN ARTERY STENOSIS: ICD-10-CM

## 2023-07-10 DIAGNOSIS — I65.23 BILATERAL CAROTID ARTERY STENOSIS: ICD-10-CM

## 2023-07-10 DIAGNOSIS — Z98.890 HISTORY OF NECK SURGERY: ICD-10-CM

## 2023-07-10 DIAGNOSIS — E78.00 HYPERCHOLESTEROLEMIA: ICD-10-CM

## 2023-07-10 DIAGNOSIS — I10 PRIMARY HYPERTENSION: ICD-10-CM

## 2023-07-10 DIAGNOSIS — E66.3 OVERWEIGHT: ICD-10-CM

## 2023-07-10 DIAGNOSIS — Z87.898 HISTORY OF CHEST PAIN: ICD-10-CM

## 2023-07-10 DIAGNOSIS — R42 DIZZINESS: ICD-10-CM

## 2023-07-10 DIAGNOSIS — I50.32 HEART FAILURE, DIASTOLIC, CHRONIC: ICD-10-CM

## 2023-07-10 PROCEDURE — 3074F PR MOST RECENT SYSTOLIC BLOOD PRESSURE < 130 MM HG: ICD-10-PCS | Mod: CPTII,S$GLB,, | Performed by: INTERNAL MEDICINE

## 2023-07-10 PROCEDURE — 99214 PR OFFICE/OUTPT VISIT, EST, LEVL IV, 30-39 MIN: ICD-10-PCS | Mod: S$GLB,,, | Performed by: INTERNAL MEDICINE

## 2023-07-10 PROCEDURE — 1125F AMNT PAIN NOTED PAIN PRSNT: CPT | Mod: CPTII,S$GLB,, | Performed by: INTERNAL MEDICINE

## 2023-07-10 PROCEDURE — 99214 OFFICE O/P EST MOD 30 MIN: CPT | Mod: S$GLB,,, | Performed by: INTERNAL MEDICINE

## 2023-07-10 PROCEDURE — 1159F MED LIST DOCD IN RCRD: CPT | Mod: CPTII,S$GLB,, | Performed by: INTERNAL MEDICINE

## 2023-07-10 PROCEDURE — 1160F RVW MEDS BY RX/DR IN RCRD: CPT | Mod: CPTII,S$GLB,, | Performed by: INTERNAL MEDICINE

## 2023-07-10 PROCEDURE — 3078F PR MOST RECENT DIASTOLIC BLOOD PRESSURE < 80 MM HG: ICD-10-PCS | Mod: CPTII,S$GLB,, | Performed by: INTERNAL MEDICINE

## 2023-07-10 PROCEDURE — 99999 PR PBB SHADOW E&M-EST. PATIENT-LVL V: CPT | Mod: PBBFAC,,, | Performed by: INTERNAL MEDICINE

## 2023-07-10 PROCEDURE — 3078F DIAST BP <80 MM HG: CPT | Mod: CPTII,S$GLB,, | Performed by: INTERNAL MEDICINE

## 2023-07-10 PROCEDURE — 3074F SYST BP LT 130 MM HG: CPT | Mod: CPTII,S$GLB,, | Performed by: INTERNAL MEDICINE

## 2023-07-10 PROCEDURE — 1160F PR REVIEW ALL MEDS BY PRESCRIBER/CLIN PHARMACIST DOCUMENTED: ICD-10-PCS | Mod: CPTII,S$GLB,, | Performed by: INTERNAL MEDICINE

## 2023-07-10 PROCEDURE — 99999 PR PBB SHADOW E&M-EST. PATIENT-LVL V: ICD-10-PCS | Mod: PBBFAC,,, | Performed by: INTERNAL MEDICINE

## 2023-07-10 PROCEDURE — 1159F PR MEDICATION LIST DOCUMENTED IN MEDICAL RECORD: ICD-10-PCS | Mod: CPTII,S$GLB,, | Performed by: INTERNAL MEDICINE

## 2023-07-10 PROCEDURE — 1125F PR PAIN SEVERITY QUANTIFIED, PAIN PRESENT: ICD-10-PCS | Mod: CPTII,S$GLB,, | Performed by: INTERNAL MEDICINE
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
## 2023-09-19 DIAGNOSIS — R42 DIZZINESS: ICD-10-CM

## 2023-09-20 RX ORDER — MECLIZINE HYDROCHLORIDE 25 MG/1
25 TABLET ORAL
Qty: 30 TABLET | Refills: 11 | Status: SHIPPED | OUTPATIENT
Start: 2023-09-20

## 2024-02-12 ENCOUNTER — OFFICE VISIT (OUTPATIENT)
Dept: CARDIOLOGY | Facility: CLINIC | Age: 84
End: 2024-02-12
Attending: INTERNAL MEDICINE
Payer: MEDICARE

## 2024-02-12 VITALS
HEIGHT: 55 IN | OXYGEN SATURATION: 95 % | HEART RATE: 55 BPM | BODY MASS INDEX: 26.01 KG/M2 | SYSTOLIC BLOOD PRESSURE: 112 MMHG | WEIGHT: 112.38 LBS | DIASTOLIC BLOOD PRESSURE: 64 MMHG

## 2024-02-12 DIAGNOSIS — I10 PRIMARY HYPERTENSION: ICD-10-CM

## 2024-02-12 DIAGNOSIS — R42 DIZZINESS: ICD-10-CM

## 2024-02-12 DIAGNOSIS — E78.00 HYPERCHOLESTEROLEMIA: ICD-10-CM

## 2024-02-12 DIAGNOSIS — I65.23 BILATERAL CAROTID ARTERY STENOSIS: ICD-10-CM

## 2024-02-12 DIAGNOSIS — Z98.890 HISTORY OF NECK SURGERY: ICD-10-CM

## 2024-02-12 DIAGNOSIS — I50.32 HEART FAILURE, DIASTOLIC, CHRONIC: ICD-10-CM

## 2024-02-12 DIAGNOSIS — Z87.898 HISTORY OF CHEST PAIN: ICD-10-CM

## 2024-02-12 DIAGNOSIS — E66.3 OVERWEIGHT: ICD-10-CM

## 2024-02-12 DIAGNOSIS — I77.1 SUBCLAVIAN ARTERY STENOSIS: ICD-10-CM

## 2024-02-12 PROCEDURE — 99214 OFFICE O/P EST MOD 30 MIN: CPT | Mod: S$GLB,,, | Performed by: INTERNAL MEDICINE

## 2024-02-12 PROCEDURE — 99213 OFFICE O/P EST LOW 20 MIN: CPT | Mod: PBBFAC | Performed by: INTERNAL MEDICINE

## 2024-02-12 PROCEDURE — 99999 PR PBB SHADOW E&M-EST. PATIENT-LVL III: CPT | Mod: PBBFAC,,, | Performed by: INTERNAL MEDICINE

## 2024-02-12 RX ORDER — ASPIRIN 81 MG/1
81 TABLET ORAL DAILY
Qty: 90 TABLET | Refills: 3 | Status: SHIPPED | OUTPATIENT
Start: 2024-02-12

## 2024-02-12 RX ORDER — EZETIMIBE 10 MG/1
10 TABLET ORAL DAILY
Qty: 90 TABLET | Refills: 3 | Status: SHIPPED | OUTPATIENT
Start: 2024-02-12

## 2024-02-12 RX ORDER — METOPROLOL SUCCINATE 25 MG/1
25 TABLET, EXTENDED RELEASE ORAL DAILY
Qty: 90 TABLET | Refills: 3 | Status: SHIPPED | OUTPATIENT
Start: 2024-02-12

## 2024-02-12 RX ORDER — ATORVASTATIN CALCIUM 40 MG/1
40 TABLET, FILM COATED ORAL DAILY
Qty: 90 TABLET | Refills: 3 | Status: SHIPPED | OUTPATIENT
Start: 2024-02-12

## 2024-02-12 RX ORDER — AMLODIPINE BESYLATE 10 MG/1
10 TABLET ORAL DAILY
Qty: 90 TABLET | Refills: 3 | Status: SHIPPED | OUTPATIENT
Start: 2024-02-12

## 2024-02-12 RX ORDER — OLMESARTAN MEDOXOMIL 40 MG/1
40 TABLET ORAL DAILY
Qty: 90 TABLET | Refills: 3 | Status: SHIPPED | OUTPATIENT
Start: 2024-02-12

## 2024-02-12 RX ORDER — FUROSEMIDE 20 MG/1
20 TABLET ORAL
Qty: 90 TABLET | Refills: 3 | Status: SHIPPED | OUTPATIENT
Start: 2024-02-12
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
## 2024-04-01 ENCOUNTER — HOSPITAL ENCOUNTER (OUTPATIENT)
Dept: CARDIOLOGY | Facility: OTHER | Age: 84
Discharge: HOME | End: 2024-04-01
Attending: INTERNAL MEDICINE
Payer: MEDICARE

## 2024-04-01 DIAGNOSIS — I65.23 BILATERAL CAROTID ARTERY STENOSIS: ICD-10-CM

## 2024-04-01 LAB
LEFT ARM DIASTOLIC BLOOD PRESSURE: 67 MMHG
LEFT ARM SYSTOLIC BLOOD PRESSURE: 145 MMHG
LEFT CBA DIAS: 10 CM/S
LEFT CBA SYS: 50 CM/S
LEFT CCA DIST DIAS: 10 CM/S
LEFT CCA DIST SYS: 61 CM/S
LEFT CCA MID DIAS: 11 CM/S
LEFT CCA MID SYS: 61 CM/S
LEFT CCA PROX DIAS: 10 CM/S
LEFT CCA PROX SYS: 65 CM/S
LEFT ECA DIAS: 8 CM/S
LEFT ECA SYS: 97 CM/S
LEFT ICA DIST DIAS: 38 CM/S
LEFT ICA DIST SYS: 151 CM/S
LEFT ICA MID DIAS: 43 CM/S
LEFT ICA MID SYS: 165 CM/S
LEFT ICA PROX DIAS: 37 CM/S
LEFT ICA PROX SYS: 150 CM/S
LEFT VERTEBRAL DIAS: 9 CM/S
LEFT VERTEBRAL SYS: 55 CM/S
OHS CV CAROTID RIGHT ICA EDV HIGHEST: 25
OHS CV CAROTID ULTRASOUND LEFT ICA/CCA RATIO: 2.7
OHS CV CAROTID ULTRASOUND RIGHT ICA/CCA RATIO: 2.13
OHS CV PV CAROTID LEFT HIGHEST CCA: 65
OHS CV PV CAROTID LEFT HIGHEST ICA: 165
OHS CV PV CAROTID RIGHT HIGHEST CCA: 66
OHS CV PV CAROTID RIGHT HIGHEST ICA: 98
OHS CV US CAROTID LEFT HIGHEST EDV: 43
RIGHT ARM DIASTOLIC BLOOD PRESSURE: 58 MMHG
RIGHT ARM SYSTOLIC BLOOD PRESSURE: 124 MMHG
RIGHT CBA DIAS: 7 CM/S
RIGHT CBA SYS: 48 CM/S
RIGHT CCA DIST DIAS: 8 CM/S
RIGHT CCA DIST SYS: 46 CM/S
RIGHT CCA MID DIAS: 10 CM/S
RIGHT CCA MID SYS: 59 CM/S
RIGHT CCA PROX DIAS: 8 CM/S
RIGHT CCA PROX SYS: 66 CM/S
RIGHT ECA DIAS: 10 CM/S
RIGHT ECA SYS: 68 CM/S
RIGHT ICA DIST DIAS: 20 CM/S
RIGHT ICA DIST SYS: 85 CM/S
RIGHT ICA MID DIAS: 25 CM/S
RIGHT ICA MID SYS: 98 CM/S
RIGHT ICA PROX DIAS: 15 CM/S
RIGHT ICA PROX SYS: 65 CM/S
RIGHT VERTEBRAL DIAS: 9 CM/S
RIGHT VERTEBRAL SYS: 55 CM/S

## 2024-04-01 PROCEDURE — 93880 EXTRACRANIAL BILAT STUDY: CPT | Mod: 26,,, | Performed by: INTERNAL MEDICINE

## 2024-04-01 PROCEDURE — 93880 EXTRACRANIAL BILAT STUDY: CPT

## 2024-07-05 NOTE — PROGRESS NOTES
Subjective:     Ting Ward is a 79 y.o. female with hypertension and hypercholesterolemia. She had a Stress Echocardiogram in 2013 that was negative. She had T wave inversion in the anteroseptal leads at that time. On 10/26/2015 she had an Echocardiogram that revealed normal left ventricular size and systolic function with moderate left ventricular hypertrophy with a sigmoid septum. There was mild diastolic dysfunction and the aortic valve was mildy sclerotic. A Carotid Duplex on the same day revealed the BRITT to have moderate plaquing with a less than 50% stenosis. The LICA had severe plaquing with a 50-60% stenosis. The right vertebral had retrograde flow suggesting a subclavian steal syndrome. A blood pressure check revealed a systolic pressure in the left arm of 160 mmHg whereas it was 115 mmHg in the right arm. 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 On 1/21/2016 she underwent a Stress MPI during which she was able to do 6:30 minutes and there was no chest pain. The ECG was negative as was the MPI. She developed weakness in her arms and was diagnosed with severe cervical disease and underwent neck surgery on 8/23/2016. She developed an upper respiratory infection in late 3/2018 and was seen at an urgent care center and given antibiotics. A CXR revealed small pleural effusions. On 4/5/2018 she was began on furosemide and her dyspnea resolved. No exertional chest pain but shortness of breath. No palpitations or weak spells. No bleeding. She got  in 4/2018. She may get a bit tired in the right arm. Feeling well overall except for multiple orthopedic issues.      Chest Pain   This is a chronic problem. The problem has been resolved. Associated symptoms include back pain. Pertinent negatives include no abdominal pain, claudication, cough, diaphoresis, dizziness,  exertional chest pressure, fever, headaches, hemoptysis, irregular heartbeat, leg pain, lower extremity edema, malaise/fatigue, nausea, near-syncope, numbness, orthopnea, palpitations, PND, shortness of breath, sputum production, syncope, vomiting or weakness.   Her past medical history is significant for CHF and hyperlipidemia.   Pertinent negatives for past medical history include no diabetes and no muscle weakness.   Congestive Heart Failure  Presents for follow-up visit. Pertinent negatives include no abdominal pain, chest pain, chest pressure, claudication, edema, fatigue, muscle weakness, near-syncope, nocturia, orthopnea, palpitations, paroxysmal nocturnal dyspnea, shortness of breath or unexpected weight change. The symptoms have been stable.   Hypertension  This is a chronic problem. The current episode started more than 1 year ago. The problem is unchanged. The problem is controlled (usually 120-130/60-70 mmHg at home). Associated symptoms include neck pain. Pertinent negatives include no anxiety, blurred vision, chest pain, headaches, malaise/fatigue, orthopnea, palpitations, peripheral edema, PND, shortness of breath or sweats. There is no history of chronic renal disease.   Hyperlipidemia  This is a chronic problem. The current episode started more than 1 year ago. The problem is controlled. Recent lipid tests were reviewed and are normal. She has no history of chronic renal disease, diabetes, hypothyroidism, liver disease, obesity or nephrotic syndrome. Pertinent negatives include no chest pain, focal sensory loss, focal weakness, leg pain, myalgias or shortness of breath.       Review of Systems   Constitution: Negative for chills, diaphoresis, fatigue, fever, malaise/fatigue and unexpected weight change.   HENT: Negative for nosebleeds.    Eyes: Negative for blurred vision, vision loss in left eye and vision loss in right eye.   Cardiovascular: Negative for chest pain, claudication, dyspnea on  exertion, irregular heartbeat, leg swelling, near-syncope, orthopnea, palpitations, paroxysmal nocturnal dyspnea and syncope.   Respiratory: Negative for cough, hemoptysis, shortness of breath, sputum production and wheezing.    Endocrine: Negative for cold intolerance and heat intolerance.   Hematologic/Lymphatic: Negative for bleeding problem. Does not bruise/bleed easily.   Skin: Negative for dry skin and rash.   Musculoskeletal: Positive for arthritis, back pain, joint pain and neck pain. Negative for falls, gout, muscle cramps, muscle weakness and myalgias.   Gastrointestinal: Negative for abdominal pain, diarrhea, hematemesis, hematochezia, hemorrhoids, jaundice, melena, nausea and vomiting.   Genitourinary: Negative for dysuria, hematuria, menorrhagia and nocturia.   Neurological: Negative for dizziness, focal weakness, headaches, light-headedness, loss of balance, numbness, tremors, vertigo and weakness.   Psychiatric/Behavioral: Negative for altered mental status, depression and memory loss. The patient is not nervous/anxious.    Allergic/Immunologic: Negative for hives and persistent infections.       Current Outpatient Medications on File Prior to Visit   Medication Sig Dispense Refill    alendronate (FOSAMAX) 70 MG tablet   2    amLODIPine (NORVASC) 10 MG tablet TAKE 1 TABLET(10 MG) BY MOUTH EVERY DAY 90 tablet 3    amoxicillin-clavulanate 875-125mg (AUGMENTIN) 875-125 mg per tablet   0    aspirin (ECOTRIN) 81 MG EC tablet Take 1 tablet (81 mg total) by mouth once daily. 90 tablet 3    atorvastatin (LIPITOR) 40 MG tablet TAKE 1 TABLET(40 MG) BY MOUTH EVERY DAY 90 tablet 3    benzonatate (TESSALON) 100 MG capsule   0    calcium carbonate (OS-MIGUEL) 500 mg calcium (1,250 mg) chewable tablet Take 1 tablet by mouth once daily.      CALCIUM GLUBIONATE (CALCIONATE ORAL) Take by mouth.      erythromycin (ROMYCIN) ophthalmic ointment   0    escitalopram oxalate (LEXAPRO) 10 MG tablet Take 10 mg by mouth  once daily.      furosemide (LASIX) 20 MG tablet Take 1 tablet (20 mg total) by mouth once daily. 90 tablet 3    gabapentin (NEURONTIN) 100 MG capsule   2    hydrocodone-acetaminophen 7.5-325mg (NORCO) 7.5-325 mg per tablet TK 1 T PO  BID TO TID  0    metoprolol succinate (TOPROL-XL) 50 MG 24 hr tablet Take 1 tablet (50 mg total) by mouth once daily. 90 tablet 3    olmesartan (BENICAR) 40 MG tablet TAKE 1 TABLET(40 MG) BY MOUTH EVERY DAY 90 tablet 3    paroxetine (PAXIL) 10 MG tablet Take 10 mg by mouth every morning.      tizanidine (ZANAFLEX) 2 MG tablet   2    VESICARE 10 mg tablet TK 1 T PO QD  3    vitamin D 1000 units Tab Take 185 mg by mouth once daily.       No current facility-administered medications on file prior to visit.        /60 (BP Location: Left arm, Patient Position: Sitting)   Pulse (!) 52   Wt 53.8 kg (118 lb 9.7 oz)   BMI 23.96 kg/m²       Objective:     Physical Exam   Constitutional: She is oriented to person, place, and time. She appears well-developed and well-nourished. She does not appear ill. No distress.   HENT:   Head: Normocephalic and atraumatic.   Nose: Nose normal.   Eyes: Right eye exhibits no discharge. Left eye exhibits no discharge. Right conjunctiva is not injected. Left conjunctiva is not injected. Right pupil is round. Left pupil is round. Pupils are equal.   Neck: No JVD present. Carotid bruit is present (louder on the right than on the left side). No tracheal deviation present. No thyromegaly present.   Midline scar posteriorly.   Cardiovascular: Normal rate, regular rhythm, S1 normal and S2 normal.  No extrasystoles are present. PMI is not displaced. Exam reveals gallop and S4. Exam reveals no S3.   Murmur heard.   Harsh midsystolic murmur is present with a grade of 2/6 at the upper right sternal border radiating to the neck.  Pulses:       Carotid pulses are on the right side with bruit and on the left side with bruit.       Radial pulses are 1+ on the  "right side and 2+ on the left side.        Femoral pulses are 2+ on the right side and 2+ on the left side.       Dorsalis pedis pulses are 1+ on the right side and 1+ on the left side.        Posterior tibial pulses are 1+ on the right side and 1+ on the left side.   Pulmonary/Chest: Effort normal and breath sounds normal.   Abdominal: Soft. Normal appearance. There is no hepatosplenomegaly. There is no abdominal tenderness.   Musculoskeletal:      Right ankle: She exhibits no swelling, no ecchymosis and no deformity.      Left ankle: She exhibits no swelling, no ecchymosis and no deformity.   Lymphadenopathy:        Head (right side): No submandibular adenopathy present.        Head (left side): No submandibular adenopathy present.     She has no cervical adenopathy.   Neurological: She is alert and oriented to person, place, and time. She is not disoriented. No cranial nerve deficit.   Skin: Skin is warm, dry and intact. No rash noted. She is not diaphoretic.   Psychiatric: She has a normal mood and affect. Her speech is normal and behavior is normal. Judgment and thought content normal. Cognition and memory are normal.       Assessment:      1. History of chest pain    2. Heart failure, diastolic, chronic    3. Bilateral carotid artery stenosis    4. Subclavian artery stenosis    5. Essential hypertension    6. Hypercholesterolemia    7. History of neck surgery        Plan:     1. History of Chest Pain   9/23/2013: Stress Echo: 4:30 min. No CP. ECG negative. Echo "negative" with moderate LVH.   10/26/2015: Echo: Normal left ventricular size and systolic function. Moderate LVH. Sigmoid septum. Mild diastolic dysfunction. Mildly dilated LA. Mild aortic valve sclerosis.   12/17/2015: Episode of chest tightness.   12/21/2015: ECG: SB 57. T wave inversion V1-V6 that is similar to 2013.   1/21/2016: Stress MPI: 6:30 min. No CP. ECG negative. MPI negative.   Appeared chest pain was atypical.   Resolved.    2. Heart " Failure, Diastolic, Chronic   4/11/2018: Echo: Normal left ventricular size and systolic function. Mild LVH. Sigmoid septum - 1.8 m/s - 12 mmHg. Mild diastolic dysfunction. Mildly dilated LA. Mild aortic valve sclerosis.   4/4/2018: CXR: Small pleural effusions.   On furosemide 20 mg Q24.   Well compensated.    3. Carotid Artery Stenosis   10/26/2015: Carotid Duplex: BRITT: Moderate plaquing - <50%. LICA: Severe plaquing - 50-60%. Right vertebral: Retrograde flow.   10/17/2016: Carotid Duplex: BRITT: Moderate plaquing - <50%. LICA: Moderate plaquing - 1.2 m/s - 50-60%. Right vertebral: Retrograde flow.    10/16/2017: Carotid Duplex: BRITT: Moderate plaquing - <50%. LICA: Severe plaquing - 1.5 m/s - 50-60%.   11/26/2018: Carotid Duplex: BIRTT: Moderate plaquing - <50%. LICA: Moderate plaquing - 1.3 m/s - 50-60%. Right vertebral: Retrograde flow.   11/19/2019: Carotid Duplex: BRITT: Moderate plaquing - 1.1 m/s - <50%. LICA: Moderate plaquing - 1.5 m/s - 50-60%. Right vertebral: Retrograde flow.   11/2020: Plan next Carotid Duplex. Then yearly.   On aspirin 81 mg Q24.1   Control risk factors.    4. Subclavian Artery Stenosis   2015: Clinical diagnosis.   10/26/2015: Carotid Duplex: Right vertebral: Retrograde flow.   Right arm: 115/55 mmHg. Left arm: 160/68 mmHg.   May have had a little heaviness in the right arm in the past but never dizziness.   Wants conservative care.    5. Hypertension   1995: Diagnosed.   On metoprolol 50 mg Q24, amlodipine 10 mg Q24, olmesartan 40 mg Q24 and furosemide 20 mg Q24.   Keeping log at home.   Well controlled.   7/6/2020: HR 51. Will reduce metoprolol 50 mg Q24 to metoprolol 25 mg Q24.       6. Hypercholesterolemia   2005: Began statin.   On atorvastatin 10 mg Q24.   12/28/2015: Chol 189. HDL 45. . .   On atorvastatin to 40 mg Q24.   2/8/2016: Chol 179. HDL 48. . .   On atorvastatin to 40 mg Q24.   Reasonably well controlled.    7. History of Cervical Neck  Surgery   8/23/2016: Cervical neck surgery.   Continues to have pain in her neck.   Dr. Ramsey Becker.     8. Primary Care   Dr. Sultana Guzman.    F/u 6 months.    Tammy Walton M.D.      7/17/2020 12:18 PM, Addendum:    7/17/2020: Carotid Duplex: BRITT: Moderate plaquing - <50%. LICA: Moderate plaquing - 1.5 m/s - 50-60%. Right vertebral: Retrograde.    I discussed the above test result and the implications of the findings over the phone.    Tammy Walton M.D.       5 (moderate pain)

## 2024-09-26 ENCOUNTER — OFFICE VISIT (OUTPATIENT)
Dept: CARDIOLOGY | Facility: CLINIC | Age: 84
End: 2024-09-26
Attending: INTERNAL MEDICINE
Payer: MEDICARE

## 2024-09-26 VITALS
HEIGHT: 55 IN | BODY MASS INDEX: 27.37 KG/M2 | DIASTOLIC BLOOD PRESSURE: 62 MMHG | SYSTOLIC BLOOD PRESSURE: 137 MMHG | OXYGEN SATURATION: 96 % | WEIGHT: 118.25 LBS | HEART RATE: 56 BPM

## 2024-09-26 DIAGNOSIS — Z87.898 HISTORY OF CHEST PAIN: ICD-10-CM

## 2024-09-26 DIAGNOSIS — R42 DIZZINESS: ICD-10-CM

## 2024-09-26 DIAGNOSIS — I65.23 BILATERAL CAROTID ARTERY STENOSIS: ICD-10-CM

## 2024-09-26 DIAGNOSIS — E66.3 OVERWEIGHT: ICD-10-CM

## 2024-09-26 DIAGNOSIS — I10 PRIMARY HYPERTENSION: ICD-10-CM

## 2024-09-26 DIAGNOSIS — E78.00 HYPERCHOLESTEROLEMIA: ICD-10-CM

## 2024-09-26 DIAGNOSIS — Z98.890 HISTORY OF NECK SURGERY: ICD-10-CM

## 2024-09-26 DIAGNOSIS — I77.1 SUBCLAVIAN ARTERY STENOSIS: ICD-10-CM

## 2024-09-26 DIAGNOSIS — I50.32 HEART FAILURE, DIASTOLIC, CHRONIC: ICD-10-CM

## 2024-09-26 LAB
OHS QRS DURATION: 72 MS
OHS QTC CALCULATION: 442 MS

## 2024-09-26 PROCEDURE — 99999 PR PBB SHADOW E&M-EST. PATIENT-LVL III: CPT | Mod: PBBFAC,,, | Performed by: INTERNAL MEDICINE

## 2024-09-26 RX ORDER — FUROSEMIDE 20 MG/1
20 TABLET ORAL
Qty: 90 TABLET | Refills: 3 | Status: SHIPPED | OUTPATIENT
Start: 2024-09-27

## 2024-09-26 RX ORDER — EZETIMIBE 10 MG/1
10 TABLET ORAL DAILY
Qty: 90 TABLET | Refills: 3 | Status: SHIPPED | OUTPATIENT
Start: 2024-09-26

## 2024-09-26 RX ORDER — ATORVASTATIN CALCIUM 40 MG/1
40 TABLET, FILM COATED ORAL DAILY
Qty: 90 TABLET | Refills: 3 | Status: SHIPPED | OUTPATIENT
Start: 2024-09-26

## 2024-09-26 RX ORDER — ASPIRIN 81 MG/1
81 TABLET ORAL DAILY
Qty: 90 TABLET | Refills: 3 | Status: SHIPPED | OUTPATIENT
Start: 2024-09-26

## 2024-09-26 RX ORDER — METOPROLOL SUCCINATE 25 MG/1
25 TABLET, EXTENDED RELEASE ORAL DAILY
Qty: 90 TABLET | Refills: 3 | Status: SHIPPED | OUTPATIENT
Start: 2024-09-26

## 2024-09-26 RX ORDER — OLMESARTAN MEDOXOMIL 40 MG/1
40 TABLET ORAL DAILY
Qty: 90 TABLET | Refills: 3 | Status: SHIPPED | OUTPATIENT
Start: 2024-09-26

## 2024-09-26 RX ORDER — AMLODIPINE BESYLATE 10 MG/1
10 TABLET ORAL DAILY
Qty: 90 TABLET | Refills: 3 | Status: SHIPPED | OUTPATIENT
Start: 2024-09-26
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
## 2024-09-27 ENCOUNTER — PATIENT MESSAGE (OUTPATIENT)
Dept: CARDIOLOGY | Facility: CLINIC | Age: 84
End: 2024-09-27
Payer: MEDICARE

## 2025-03-19 ENCOUNTER — OFFICE VISIT (OUTPATIENT)
Dept: CARDIOLOGY | Facility: CLINIC | Age: 85
End: 2025-03-19
Attending: INTERNAL MEDICINE
Payer: MEDICARE

## 2025-03-19 VITALS
SYSTOLIC BLOOD PRESSURE: 128 MMHG | DIASTOLIC BLOOD PRESSURE: 54 MMHG | OXYGEN SATURATION: 96 % | HEIGHT: 55 IN | BODY MASS INDEX: 28.06 KG/M2 | WEIGHT: 121.25 LBS | HEART RATE: 56 BPM

## 2025-03-19 DIAGNOSIS — E66.3 OVERWEIGHT: ICD-10-CM

## 2025-03-19 DIAGNOSIS — Z87.898 HISTORY OF CHEST PAIN: ICD-10-CM

## 2025-03-19 DIAGNOSIS — I10 PRIMARY HYPERTENSION: ICD-10-CM

## 2025-03-19 DIAGNOSIS — I77.1 SUBCLAVIAN ARTERY STENOSIS: ICD-10-CM

## 2025-03-19 DIAGNOSIS — I50.32 HEART FAILURE, DIASTOLIC, CHRONIC: ICD-10-CM

## 2025-03-19 DIAGNOSIS — I65.23 BILATERAL CAROTID ARTERY STENOSIS: ICD-10-CM

## 2025-03-19 DIAGNOSIS — R42 DIZZINESS: ICD-10-CM

## 2025-03-19 DIAGNOSIS — Z98.890 HISTORY OF NECK SURGERY: ICD-10-CM

## 2025-03-19 DIAGNOSIS — E78.00 HYPERCHOLESTEROLEMIA: ICD-10-CM

## 2025-03-19 PROCEDURE — 99214 OFFICE O/P EST MOD 30 MIN: CPT | Mod: S$GLB,,, | Performed by: INTERNAL MEDICINE

## 2025-03-19 PROCEDURE — 3074F SYST BP LT 130 MM HG: CPT | Mod: CPTII,S$GLB,, | Performed by: INTERNAL MEDICINE

## 2025-03-19 PROCEDURE — 99999 PR PBB SHADOW E&M-EST. PATIENT-LVL IV: CPT | Mod: PBBFAC,,, | Performed by: INTERNAL MEDICINE

## 2025-03-19 PROCEDURE — 3288F FALL RISK ASSESSMENT DOCD: CPT | Mod: CPTII,S$GLB,, | Performed by: INTERNAL MEDICINE

## 2025-03-19 PROCEDURE — 1125F AMNT PAIN NOTED PAIN PRSNT: CPT | Mod: CPTII,S$GLB,, | Performed by: INTERNAL MEDICINE

## 2025-03-19 PROCEDURE — 1159F MED LIST DOCD IN RCRD: CPT | Mod: CPTII,S$GLB,, | Performed by: INTERNAL MEDICINE

## 2025-03-19 PROCEDURE — 1101F PT FALLS ASSESS-DOCD LE1/YR: CPT | Mod: CPTII,S$GLB,, | Performed by: INTERNAL MEDICINE

## 2025-03-19 PROCEDURE — 3078F DIAST BP <80 MM HG: CPT | Mod: CPTII,S$GLB,, | Performed by: INTERNAL MEDICINE

## 2025-03-19 RX ORDER — OMEPRAZOLE 20 MG/1
20 TABLET, DELAYED RELEASE ORAL DAILY
COMMUNITY

## 2025-03-19 RX ORDER — IPRATROPIUM BROMIDE AND ALBUTEROL 20; 100 UG/1; UG/1
SPRAY, METERED RESPIRATORY (INHALATION)
COMMUNITY

## 2025-03-19 RX ORDER — MEMANTINE HYDROCHLORIDE 10 MG/1
1 TABLET ORAL DAILY
COMMUNITY
Start: 2025-03-10

## 2025-03-19 RX ORDER — FUROSEMIDE 20 MG/1
20 TABLET ORAL
Qty: 90 TABLET | Refills: 3 | Status: SHIPPED | OUTPATIENT
Start: 2025-03-19

## 2025-03-19 RX ORDER — FLUTICASONE FUROATE, UMECLIDINIUM BROMIDE AND VILANTEROL TRIFENATATE 100; 62.5; 25 UG/1; UG/1; UG/1
1 POWDER RESPIRATORY (INHALATION)
COMMUNITY
Start: 2025-03-06
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
## 2025-03-23 DIAGNOSIS — I10 PRIMARY HYPERTENSION: ICD-10-CM

## 2025-03-24 RX ORDER — OLMESARTAN MEDOXOMIL 40 MG/1
40 TABLET ORAL
Qty: 90 TABLET | Refills: 3 | Status: SHIPPED | OUTPATIENT
Start: 2025-03-24

## 2025-04-16 ENCOUNTER — HOSPITAL ENCOUNTER (EMERGENCY)
Facility: HOSPITAL | Age: 85
Discharge: HOME | End: 2025-04-16
Attending: STUDENT IN AN ORGANIZED HEALTH CARE EDUCATION/TRAINING PROGRAM
Payer: MEDICARE

## 2025-04-16 VITALS
BODY MASS INDEX: 28.7 KG/M2 | DIASTOLIC BLOOD PRESSURE: 56 MMHG | TEMPERATURE: 98 F | OXYGEN SATURATION: 98 % | WEIGHT: 124 LBS | HEIGHT: 55 IN | RESPIRATION RATE: 18 BRPM | HEART RATE: 66 BPM | SYSTOLIC BLOOD PRESSURE: 122 MMHG

## 2025-04-16 DIAGNOSIS — J06.9 VIRAL URI WITH COUGH: Primary | ICD-10-CM

## 2025-04-16 DIAGNOSIS — R06.02 SHORTNESS OF BREATH: ICD-10-CM

## 2025-04-16 LAB
ALBUMIN SERPL-MCNC: 4.2 G/DL (ref 3.3–5.5)
ALP SERPL-CCNC: 82 U/L (ref 42–141)
BILIRUB SERPL-MCNC: 0.8 MG/DL (ref 0.2–1.6)
BUN SERPL-MCNC: 11 MG/DL (ref 7–22)
CALCIUM SERPL-MCNC: 9.9 MG/DL (ref 8–10.3)
CHLORIDE SERPL-SCNC: 113 MMOL/L (ref 98–108)
CREAT SERPL-MCNC: 1 MG/DL (ref 0.6–1.2)
CTP QC/QA: YES
GLUCOSE SERPL-MCNC: 117 MG/DL (ref 73–118)
HCT, POC: NORMAL
HGB, POC: NORMAL (ref 14–18)
INFLUENZA A ANTIGEN, POC: NEGATIVE
INFLUENZA B ANTIGEN, POC: NEGATIVE
MCH, POC: NORMAL
MCHC, POC: NORMAL
MCV, POC: NORMAL
MPV, POC: NORMAL
OHS QRS DURATION: 68 MS
OHS QTC CALCULATION: 455 MS
POC ALT (SGPT): 20 U/L (ref 10–47)
POC AST (SGOT): 31 U/L (ref 11–38)
POC PLATELET COUNT: NORMAL
POC TCO2: 24 MMOL/L (ref 18–33)
POTASSIUM BLD-SCNC: 4.2 MMOL/L (ref 3.6–5.1)
PROTEIN, POC: 7.2 G/DL (ref 6.4–8.1)
RBC, POC: NORMAL
RDW, POC: NORMAL
SARS-COV-2 RDRP RESP QL NAA+PROBE: NEGATIVE
SODIUM BLD-SCNC: 148 MMOL/L (ref 128–145)
WBC, POC: NORMAL

## 2025-04-16 PROCEDURE — 99284 EMERGENCY DEPT VISIT MOD MDM: CPT | Mod: 25,ER

## 2025-04-16 PROCEDURE — 93005 ELECTROCARDIOGRAM TRACING: CPT | Mod: ER

## 2025-04-16 PROCEDURE — 85025 COMPLETE CBC W/AUTO DIFF WBC: CPT | Mod: ER

## 2025-04-16 PROCEDURE — 87804 INFLUENZA ASSAY W/OPTIC: CPT | Mod: ER

## 2025-04-16 PROCEDURE — 25000242 PHARM REV CODE 250 ALT 637 W/ HCPCS: Mod: ER

## 2025-04-16 PROCEDURE — 93010 ELECTROCARDIOGRAM REPORT: CPT | Mod: ,,, | Performed by: INTERNAL MEDICINE

## 2025-04-16 PROCEDURE — 87635 SARS-COV-2 COVID-19 AMP PRB: CPT | Mod: ER

## 2025-04-16 PROCEDURE — 80053 COMPREHEN METABOLIC PANEL: CPT | Mod: ER

## 2025-04-16 PROCEDURE — 94640 AIRWAY INHALATION TREATMENT: CPT | Mod: ER

## 2025-04-16 RX ORDER — IPRATROPIUM BROMIDE AND ALBUTEROL SULFATE 2.5; .5 MG/3ML; MG/3ML
3 SOLUTION RESPIRATORY (INHALATION)
Status: COMPLETED | OUTPATIENT
Start: 2025-04-16 | End: 2025-04-16

## 2025-04-16 RX ORDER — CETIRIZINE HYDROCHLORIDE 10 MG/1
10 TABLET ORAL DAILY
Qty: 30 TABLET | Refills: 0 | Status: SHIPPED | OUTPATIENT
Start: 2025-04-16 | End: 2026-04-16

## 2025-04-16 RX ORDER — ALBUTEROL SULFATE 2.5 MG/.5ML
2.5 SOLUTION RESPIRATORY (INHALATION) EVERY 6 HOURS PRN
Qty: 10 EACH | Refills: 0 | Status: SHIPPED | OUTPATIENT
Start: 2025-04-16 | End: 2026-04-16

## 2025-04-16 RX ORDER — BENZONATATE 100 MG/1
100 CAPSULE ORAL 3 TIMES DAILY PRN
Qty: 20 CAPSULE | Refills: 0 | Status: SHIPPED | OUTPATIENT
Start: 2025-04-16 | End: 2025-04-26

## 2025-04-16 RX ADMIN — IPRATROPIUM BROMIDE AND ALBUTEROL SULFATE 3 ML: .5; 3 SOLUTION RESPIRATORY (INHALATION) at 11:04

## 2025-04-16 NOTE — ED PROVIDER NOTES
Encounter Date: 4/16/2025    SCRIBE #1 NOTE: I, Naye Starks, am scribing for, and in the presence of,  Deirdre Armenta PA-C. I have scribed the following portions of the note - Other sections scribed: HPI,ROS.       History     Chief Complaint   Patient presents with    Cough     Productive clear cough with sore throat x 3 days      Ting Ward is a 84 y.o. female, with a PMHx of CHF(2018), HTN, hypercholesterolemia, carotid artery stenosis, reported COPD, who presents to the ED with complaint of URI symptoms that began 3 days ago. Symptoms include: clear productive cough, rhinorrhea, congestion, sore throat, back myalgias. Patient further reports dyspnea 2/2 COPD exacerbation that began yesterday. Relative at bedside reports shortness of breath has progressively worsened. She reports exacerbation with walking and laying flat. She reports attempted treatment with breathing treatment last night. Denies any known sick contacts. No other exacerbating or alleviating factors. Patient reports compliance with her prescribed medications. Denies chest pain , nausea, emesis, ear pain, abdominal pain, fever, chills, or other associated symptoms.     The history is provided by the patient. No  was used.     Review of patient's allergies indicates:   Allergen Reactions    Codeine Hallucinations     Past Medical History:   Diagnosis Date    Carotid artery stenosis 12/21/2015    10/26/2015: Carotid Duplex: BRITT: moderate plaquing - <50%. BRITT: severe plaquing - 50-60%. Right vertebral: retrograde flow.    Chest pain 12/21/2015 12/17/2015: Episode of chest tightness.    Heart failure, diastolic, acute on chronic 4/5/2018 4/4/2018: CXR: Small pleural effusions.    Hypercholesterolemia 12/21/2015 1995: Began statin.    Hypertension, benign 12/21/2015 1995: Diagnosed.    Pre-operative cardiovascular examination 8/17/2016 8/23/2016: Plan is cervical neck surgery.     Past Surgical History:    Procedure Laterality Date    SUBTOTAL COLECTOMY N/A 2014    Polyp surgically removed     No family history on file.  Social History[1]  Review of Systems   Constitutional:  Negative for fever.   HENT:  Positive for congestion, rhinorrhea and sore throat.    Eyes:  Negative for visual disturbance.   Respiratory:  Positive for cough (clear production) and shortness of breath (2/2 COPD exacerbation).    Cardiovascular:  Negative for chest pain and leg swelling.   Gastrointestinal:  Negative for abdominal pain, nausea and vomiting.   Genitourinary:  Negative for difficulty urinating.   Musculoskeletal:  Positive for myalgias (back region). Negative for back pain.   Skin:  Negative for rash.   Neurological:  Negative for headaches.       Physical Exam     Initial Vitals [04/16/25 1054]   BP Pulse Resp Temp SpO2   (!) 121/52 60 17 97.7 °F (36.5 °C) 98 %      MAP       --         Physical Exam    Vitals reviewed.  Constitutional: She appears well-developed and well-nourished. No distress.   HENT:   Head: Normocephalic.   Right Ear: Tympanic membrane and external ear normal.   Left Ear: Tympanic membrane and external ear normal.   Nose: Nose normal. Mouth/Throat: Uvula is midline. Posterior oropharyngeal erythema present.   Eyes: Conjunctivae are normal.   Cardiovascular:  Normal rate, regular rhythm and intact distal pulses.     Exam reveals no gallop and no friction rub.       No murmur heard.  Pulmonary/Chest: Breath sounds normal. No respiratory distress. She has no wheezes. She has no rhonchi. She has no rales.   Speaking in full complete sentences.  In no acute respiratory distress.  No increased WOB   Abdominal: Abdomen is soft. Bowel sounds are normal. She exhibits no distension. There is no abdominal tenderness. There is no rebound, no guarding, no tenderness at McBurney's point and negative Benton's sign.   Musculoskeletal:         General: Normal range of motion.     Lymphadenopathy:     She has no cervical  adenopathy.   Neurological: She is alert.   Skin: Skin is warm and dry.   Psychiatric: She has a normal mood and affect.         ED Course   Procedures  Labs Reviewed   POCT CMP - Abnormal       Result Value    Albumin, POC 4.2      Alkaline Phosphatase, POC 82      ALT (SGPT), POC 20      AST (SGOT), POC 31      POC BUN 11      Calcium, POC 9.9      POC Chloride 113 (*)     POC Creatinine 1.0      POC Glucose 117      POC Potassium 4.2      POC Sodium 148 (*)     Bilirubin, POC 0.8      POC TCO2 24      Protein, POC 7.2     SARS-COV-2 RDRP GENE    POC Rapid COVID Negative       Acceptable Yes     POCT CBC    Hematocrit        Hemoglobin        RBC        WBC        MCV        MCH, POC        MCHC        RDW-CV        Platelet Count, POC        MPV       POCT INFLUENZA A/B MOLECULAR   POCT CMP   POCT RAPID INFLUENZA A/B    Influenza B Ag negative      Inflenza A Ag negative            Imaging Results              X-Ray Chest 1 View (Final result)  Result time 04/16/25 11:42:53      Final result by Alec Ivory MD (04/16/25 11:42:53)                   Impression:      See above      Electronically signed by: Alec Ivory MD  Date:    04/16/2025  Time:    11:42               Narrative:    EXAMINATION:  XR CHEST 1 VIEW    CLINICAL HISTORY:  shortness of breath;    TECHNIQUE:  Single frontal view of the chest was performed.    COMPARISON:  None 04/20/2018    FINDINGS:  Heart size normal.  Diffuse accentuation interstitial markings identified.  No significant airspace consolidation or pleural effusion.  Postoperative changes noted in the cervical spine as                                       Medications   albuterol-ipratropium 2.5 mg-0.5 mg/3 mL nebulizer solution 3 mL (3 mLs Nebulization Given 4/16/25 1114)     Medical Decision Making  84-year-old female presents secondary to URI symptoms and SOB.  Differential diagnosis includes but isn't limited to:  PE, MI/ACS, pneumothorax, pericardial  effusion/tamonade, pneumonia, lung abscess, pericarditis/myocarditis, pleural effusion, lung mass, CHF exacerbation, asthma exacerbation, COPD exacerbation, aspirated/ingested foreign body, airway obstruction, CO poisoning, anemia, metabolic derangement, allergy/atopy, influenza, viral URI, viral syndrome.     Vital signs reassuring.  Afebrile. On physical exam, patient appears well hydrated with moist mucus membranes. Breath sounds are clear and equal bilaterally with no adventitious breath sounds, tachypnea or respiratory distress. Regular rate and rhythm. No murmurs. Abdomen soft and non tender. Patient is tolerating PO without difficulty. Physical exam otherwise as above    COVID and flu swabs needed.  Labs unremarkable.  Chest x-ray showed no acute abnormalities.  EKG showed sinus bradycardia with a rate 57, no STEMI.  All T-wave inversions noted unchanged from previous EKG.  Patient given DuoNeb here in ED with improvement of symptoms. Overall impression is viral URI with cough.  Considered at this time but doubt PE, wells negative.  Will send home with supportive treatment.  Patient also requesting refill of nebulizer fluid.  We discussed strict return precautions.  Instructed to follow up with PCP in the next 2-3 days.  Patient's family member were understanding agreeable with treatment plan.        Amount and/or Complexity of Data Reviewed  Labs: ordered. Decision-making details documented in ED Course.  Radiology: ordered.  ECG/medicine tests:  Decision-making details documented in ED Course.    Risk  OTC drugs.  Prescription drug management.            Scribe Attestation:   Scribe #1: I performed the above scribed service and the documentation accurately describes the services I performed. I attest to the accuracy of the note.        ED Course as of 04/16/25 1207   Wed Apr 16, 2025   1121 EKG 12-lead  EKG independently interpreted by me:   Sinus bradycardia   Rate 57      No ST elevation   No STEMI    All T-wave inversions Unchanged from previous EKG [MB]   1133 SARS-CoV-2 RNA, Amplification, Qual: Negative [MB]   1133 POCT Rapid Influenza A/B  Negative [MB]   1133 POCT CMP(!)  No transaminitis, KYUNG, electrolyte abnormality [MB]   1142 POCT CBC  No leukocytosis or anemia [MB]   1206 X-Ray Chest 1 View  No acute abnormality [MB]      ED Course User Index  [MB] Deirdre Armenta PA-C                         I, Deirdre Armenta PA-C, personally performed the services described in this documentation. All medical record entries made by the scribe were at my direction and in my presence. I have reviewed the chart and agree that the record reflects my personal performance and is accurate and complete.    Clinical Impression:  Final diagnoses:  [R06.02] Shortness of breath  [J06.9] Viral URI with cough (Primary)          ED Disposition Condition    Discharge Stable          ED Prescriptions       Medication Sig Dispense Start Date End Date Auth. Provider    cetirizine (ZYRTEC) 10 MG tablet Take 1 tablet (10 mg total) by mouth once daily. 30 tablet 4/16/2025 4/16/2026 Deirdre Armenta PA-C    benzonatate (TESSALON) 100 MG capsule Take 1 capsule (100 mg total) by mouth 3 (three) times daily as needed for Cough. 20 capsule 4/16/2025 4/26/2025 Deirdre Armenta PA-C    albuterol sulfate 2.5 mg/0.5 mL Nebu Take 2.5 mg by nebulization every 6 (six) hours as needed (cough, shortness of breath, or wheezing). Rescue 10 each 4/16/2025 4/16/2026 Deirdre Armenta PA-C          Follow-up Information       Follow up With Specialties Details Why Contact Info    Sultana Guzman MD Internal Medicine Schedule an appointment as soon as possible for a visit in 3 days for follow up [address] [place] [id number] [phone]      [facility] ED Emergency Medicine Go to  If symptoms worsen, As needed, shortness of breath, chest pain, fever, worsening cough, nausea, vomiting, abdominal pain [address]  [facility] [id number] [phone]                 [1]   Social History  Tobacco Use    Smoking status: Former     Current packs/day: 0.00     Average packs/day: 0.1 packs/day for 34.0 years (3.4 ttl pk-yrs)     Types: Cigarettes     Start date: 1982     Quit date: 2015     Years since quittin.4    Smokeless tobacco: Never   Substance Use Topics    Alcohol use: No     Alcohol/week: 0.0 standard drinks of alcohol        Deirdre Armenta, PA-C  25 1207

## 2025-05-11 DIAGNOSIS — R42 DIZZINESS: ICD-10-CM

## 2025-05-12 RX ORDER — MECLIZINE HYDROCHLORIDE 25 MG/1
25 TABLET ORAL
Qty: 30 TABLET | Refills: 11 | Status: SHIPPED | OUTPATIENT
Start: 2025-05-12

## 2025-05-22 DIAGNOSIS — I50.32 HEART FAILURE, DIASTOLIC, CHRONIC: ICD-10-CM

## 2025-05-22 RX ORDER — EMPAGLIFLOZIN 10 MG/1
10 TABLET, FILM COATED ORAL
Qty: 90 TABLET | Refills: 3 | Status: SHIPPED | OUTPATIENT
Start: 2025-05-22

## 2025-05-26 DIAGNOSIS — I10 PRIMARY HYPERTENSION: ICD-10-CM

## 2025-05-26 RX ORDER — AMLODIPINE BESYLATE 10 MG/1
10 TABLET ORAL
Qty: 90 TABLET | Refills: 3 | Status: SHIPPED | OUTPATIENT
Start: 2025-05-26

## 2025-07-15 ENCOUNTER — TELEPHONE (OUTPATIENT)
Dept: CARDIOLOGY | Facility: CLINIC | Age: 85
End: 2025-07-15
Payer: MEDICARE

## 2025-07-15 NOTE — TELEPHONE ENCOUNTER
Pt advised to contact the prescribing MD for refill.    Copied from CRM #[id number]. Topic: Medications - Medication Refill  >> Jul 15, 2025  1:54 PM Marco Antonio wrote:  Type: RX Refill Request    Who Called: Patient    Have you contacted your pharmacy:    Refill or New Rx:memantine (NAMENDA) 10 MG Tab    RX Name and Strength:    How is the patient currently taking it? (ex. 1XDay):    Is this a 30 day or 90 day RX:    Preferred Pharmacy with phone number:[id number] - [address] [place] [facility]   Phone: [phone]  Fax: [phone]          Local or Mail Order:local    Ordering Provider:Dalton    Would the patient rather a call back or a response via [facility]? call    Best Call Back Number:[phone]    Additional Information:

## 2025-07-18 DIAGNOSIS — R41.3 MEMORY CHANGES: Primary | ICD-10-CM

## 2025-07-18 NOTE — TELEPHONE ENCOUNTER
Copied from CRM #[id number]. Topic: Medications - Medication Refill  >> Jul 18, 2025  3:15 PM Beth wrote:  Type:  Patient Call          Who Called: patient         Does the patient know what this is regarding?: Requesting a call back about a refill on Rx memantine (NAMENDA) 10 MG Tab ; Pt said that if she need a appointment please let her know ;  Pt said that she left a message previously and never received a call back ; please advise           Would the patient rather a call back or a response via [facility]? call          Best Call Back Number:  [phone]           Additional Information: pt said that she only have 5 tablets remaining

## 2025-07-28 RX ORDER — MEMANTINE HYDROCHLORIDE 10 MG/1
10 TABLET ORAL DAILY
Qty: 30 TABLET | Refills: 1 | Status: SHIPPED | OUTPATIENT
Start: 2025-07-28

## 2025-08-13 ENCOUNTER — OFFICE VISIT (OUTPATIENT)
Facility: CLINIC | Age: 85
End: 2025-08-13
Payer: MEDICARE

## 2025-08-13 VITALS
BODY MASS INDEX: 28.67 KG/M2 | WEIGHT: 123.88 LBS | DIASTOLIC BLOOD PRESSURE: 67 MMHG | HEIGHT: 55 IN | SYSTOLIC BLOOD PRESSURE: 169 MMHG | HEART RATE: 58 BPM

## 2025-08-13 DIAGNOSIS — R41.3 MEMORY CHANGES: ICD-10-CM

## 2025-08-13 DIAGNOSIS — G30.1 MILD LATE ONSET ALZHEIMER'S DEMENTIA WITHOUT BEHAVIORAL DISTURBANCE, PSYCHOTIC DISTURBANCE, MOOD DISTURBANCE, OR ANXIETY: Primary | ICD-10-CM

## 2025-08-13 DIAGNOSIS — F02.A0 MILD LATE ONSET ALZHEIMER'S DEMENTIA WITHOUT BEHAVIORAL DISTURBANCE, PSYCHOTIC DISTURBANCE, MOOD DISTURBANCE, OR ANXIETY: Primary | ICD-10-CM

## 2025-08-13 PROCEDURE — 99999 PR PBB SHADOW E&M-EST. PATIENT-LVL IV: CPT | Mod: PBBFAC,,, | Performed by: NEUROLOGICAL SURGERY

## 2025-08-13 PROCEDURE — 99203 OFFICE O/P NEW LOW 30 MIN: CPT | Mod: S$GLB,,, | Performed by: NEUROLOGICAL SURGERY

## 2025-08-13 PROCEDURE — 1101F PT FALLS ASSESS-DOCD LE1/YR: CPT | Mod: CPTII,S$GLB,, | Performed by: NEUROLOGICAL SURGERY

## 2025-08-13 PROCEDURE — 1159F MED LIST DOCD IN RCRD: CPT | Mod: CPTII,S$GLB,, | Performed by: NEUROLOGICAL SURGERY

## 2025-08-13 PROCEDURE — 3288F FALL RISK ASSESSMENT DOCD: CPT | Mod: CPTII,S$GLB,, | Performed by: NEUROLOGICAL SURGERY

## 2025-08-13 PROCEDURE — 1126F AMNT PAIN NOTED NONE PRSNT: CPT | Mod: CPTII,S$GLB,, | Performed by: NEUROLOGICAL SURGERY

## 2025-08-13 PROCEDURE — 3077F SYST BP >= 140 MM HG: CPT | Mod: CPTII,S$GLB,, | Performed by: NEUROLOGICAL SURGERY

## 2025-08-13 PROCEDURE — 3078F DIAST BP <80 MM HG: CPT | Mod: CPTII,S$GLB,, | Performed by: NEUROLOGICAL SURGERY

## 2025-08-13 RX ORDER — MEMANTINE HYDROCHLORIDE 10 MG/1
10 TABLET ORAL 2 TIMES DAILY
Qty: 180 TABLET | Refills: 3 | Status: SHIPPED | OUTPATIENT
Start: 2025-08-13